# Patient Record
Sex: MALE | Race: WHITE | NOT HISPANIC OR LATINO | Employment: FULL TIME | URBAN - METROPOLITAN AREA
[De-identification: names, ages, dates, MRNs, and addresses within clinical notes are randomized per-mention and may not be internally consistent; named-entity substitution may affect disease eponyms.]

---

## 2023-04-14 ENCOUNTER — TELEPHONE (OUTPATIENT)
Dept: PRIMARY CARE | Facility: CLINIC | Age: 63
End: 2023-04-14
Payer: COMMERCIAL

## 2023-04-14 DIAGNOSIS — E78.00 PURE HYPERCHOLESTEROLEMIA: ICD-10-CM

## 2023-04-14 RX ORDER — ATORVASTATIN CALCIUM 10 MG/1
10 TABLET, FILM COATED ORAL DAILY
Qty: 30 TABLET | Refills: 0 | Status: SHIPPED | OUTPATIENT
Start: 2023-04-14 | End: 2023-05-08 | Stop reason: SDUPTHER

## 2023-04-30 ASSESSMENT — PROMIS GLOBAL HEALTH SCALE
RATE_AVERAGE_FATIGUE: MILD
RATE_QUALITY_OF_LIFE: VERY GOOD
RATE_AVERAGE_PAIN: 0
RATE_GENERAL_HEALTH: VERY GOOD
CARRYOUT_SOCIAL_ACTIVITIES: EXCELLENT
RATE_MENTAL_HEALTH: VERY GOOD
EMOTIONAL_PROBLEMS: RARELY
RATE_PHYSICAL_HEALTH: VERY GOOD
CARRYOUT_PHYSICAL_ACTIVITIES: COMPLETELY
RATE_SOCIAL_SATISFACTION: VERY GOOD

## 2023-05-02 ENCOUNTER — OFFICE VISIT (OUTPATIENT)
Dept: PRIMARY CARE | Facility: CLINIC | Age: 63
End: 2023-05-02
Payer: COMMERCIAL

## 2023-05-02 VITALS — WEIGHT: 191 LBS | SYSTOLIC BLOOD PRESSURE: 142 MMHG | BODY MASS INDEX: 25.9 KG/M2 | DIASTOLIC BLOOD PRESSURE: 86 MMHG

## 2023-05-02 DIAGNOSIS — R79.89 LOW VITAMIN D LEVEL: ICD-10-CM

## 2023-05-02 DIAGNOSIS — E78.00 PURE HYPERCHOLESTEROLEMIA: ICD-10-CM

## 2023-05-02 DIAGNOSIS — Z12.5 SCREENING PSA (PROSTATE SPECIFIC ANTIGEN): ICD-10-CM

## 2023-05-02 DIAGNOSIS — R79.89 LOW VITAMIN B12 LEVEL: ICD-10-CM

## 2023-05-02 DIAGNOSIS — R53.83 FATIGUE, UNSPECIFIED TYPE: ICD-10-CM

## 2023-05-02 DIAGNOSIS — K21.9 GASTROESOPHAGEAL REFLUX DISEASE WITHOUT ESOPHAGITIS: ICD-10-CM

## 2023-05-02 DIAGNOSIS — E78.5 HYPERLIPIDEMIA, UNSPECIFIED HYPERLIPIDEMIA TYPE: ICD-10-CM

## 2023-05-02 DIAGNOSIS — I10 HYPERTENSION, UNSPECIFIED TYPE: Primary | ICD-10-CM

## 2023-05-02 PROBLEM — R60.0 EDEMA OF RIGHT LOWER EXTREMITY: Status: RESOLVED | Noted: 2023-05-02 | Resolved: 2023-05-02

## 2023-05-02 PROBLEM — L81.4: Status: RESOLVED | Noted: 2023-05-02 | Resolved: 2023-05-02

## 2023-05-02 PROBLEM — R07.89 CHEST WALL PAIN: Status: RESOLVED | Noted: 2023-05-02 | Resolved: 2023-05-02

## 2023-05-02 PROBLEM — N28.89 KIDNEY MASS: Status: RESOLVED | Noted: 2023-05-02 | Resolved: 2023-05-02

## 2023-05-02 PROBLEM — S99.912A LEFT ANKLE INJURY, INITIAL ENCOUNTER: Status: RESOLVED | Noted: 2023-05-02 | Resolved: 2023-05-02

## 2023-05-02 PROBLEM — S82.842A CLOSED BIMALLEOLAR FRACTURE OF LEFT ANKLE: Status: RESOLVED | Noted: 2023-05-02 | Resolved: 2023-05-02

## 2023-05-02 PROBLEM — R91.8 LUNG NODULES: Status: RESOLVED | Noted: 2023-05-02 | Resolved: 2023-05-02

## 2023-05-02 PROBLEM — J30.9 ALLERGIC RHINITIS: Status: RESOLVED | Noted: 2023-05-02 | Resolved: 2023-05-02

## 2023-05-02 PROCEDURE — 3077F SYST BP >= 140 MM HG: CPT | Performed by: INTERNAL MEDICINE

## 2023-05-02 PROCEDURE — 3079F DIAST BP 80-89 MM HG: CPT | Performed by: INTERNAL MEDICINE

## 2023-05-02 PROCEDURE — 1159F MED LIST DOCD IN RCRD: CPT | Performed by: INTERNAL MEDICINE

## 2023-05-02 PROCEDURE — 1160F RVW MEDS BY RX/DR IN RCRD: CPT | Performed by: INTERNAL MEDICINE

## 2023-05-02 PROCEDURE — 99397 PER PM REEVAL EST PAT 65+ YR: CPT | Performed by: INTERNAL MEDICINE

## 2023-05-02 PROCEDURE — 85025 COMPLETE CBC W/AUTO DIFF WBC: CPT | Performed by: INTERNAL MEDICINE

## 2023-05-02 PROCEDURE — 80061 LIPID PANEL: CPT | Performed by: INTERNAL MEDICINE

## 2023-05-02 PROCEDURE — 84153 ASSAY OF PSA TOTAL: CPT | Performed by: INTERNAL MEDICINE

## 2023-05-02 PROCEDURE — 80053 COMPREHEN METABOLIC PANEL: CPT | Performed by: INTERNAL MEDICINE

## 2023-05-02 PROCEDURE — 1036F TOBACCO NON-USER: CPT | Performed by: INTERNAL MEDICINE

## 2023-05-02 PROCEDURE — 84443 ASSAY THYROID STIM HORMONE: CPT | Performed by: INTERNAL MEDICINE

## 2023-05-02 RX ORDER — MONTELUKAST SODIUM 10 MG/1
1 TABLET ORAL EVERY EVENING
COMMUNITY
Start: 2017-05-04

## 2023-05-02 RX ORDER — TRIAMTERENE/HYDROCHLOROTHIAZID 37.5-25 MG
TABLET ORAL
COMMUNITY
Start: 2014-09-16 | End: 2023-05-08 | Stop reason: SDUPTHER

## 2023-05-02 RX ORDER — CHOLECALCIFEROL (VITAMIN D3) 25 MCG
TABLET ORAL
COMMUNITY
Start: 2014-11-29 | End: 2023-05-08 | Stop reason: SDUPTHER

## 2023-05-02 RX ORDER — OMEPRAZOLE 20 MG/1
CAPSULE, DELAYED RELEASE ORAL
COMMUNITY
Start: 2014-11-29 | End: 2023-05-08 | Stop reason: SDUPTHER

## 2023-05-02 RX ORDER — VITAMIN B COMPLEX
1 CAPSULE ORAL DAILY
COMMUNITY
End: 2023-05-08 | Stop reason: SDUPTHER

## 2023-05-02 RX ORDER — ATORVASTATIN CALCIUM 10 MG/1
1 TABLET, FILM COATED ORAL DAILY
COMMUNITY
Start: 2014-09-16 | End: 2023-05-08 | Stop reason: SDUPTHER

## 2023-05-02 RX ORDER — GLUCOSAMINE/CHONDRO SU A 500-400 MG
1 TABLET ORAL 4 TIMES DAILY
COMMUNITY

## 2023-05-02 RX ORDER — OLOPATADINE HYDROCHLORIDE 1 MG/ML
SOLUTION/ DROPS OPHTHALMIC 2 TIMES DAILY
COMMUNITY
Start: 2016-03-09

## 2023-05-02 ASSESSMENT — ENCOUNTER SYMPTOMS
DEPRESSION: 0
LOSS OF SENSATION IN FEET: 0
OCCASIONAL FEELINGS OF UNSTEADINESS: 0

## 2023-05-02 NOTE — PROGRESS NOTES
Subjective   Patient ID: Basilio Salcedo is a 63 y.o. male who presents for Annual Exam.    HPI   63 years old male comes in to see me for an age related wellness exam.  We treated him for hypertension, hyperlipidemia, GERD, right renal mass measuring 2.3 cm, monitored by CAT scans and MRIs every 6 months.  Dr. Melendez.  Medication reviewed and reconciled.  He is taking Lipitor 10 mg a day.  Maxide 37.525 mg a day and omeprazole.  He did check on colonoscopy due this year by Dr. Contreras  Review of Systems  12 system reviewed and all negative.  Except for seasonal allergies.  Objective   /86 (BP Location: Left arm, Patient Position: Sitting, BP Cuff Size: Adult)   Wt 86.6 kg (191 lb)   BMI 25.90 kg/m²     Physical Exam  Alert oriented in no distress nonicteric sclera or jaundice pleasant and cooperative.  Face symmetrical cranial nerves intact.  Neck supple no masses no lymph no thyromegaly or jugular venous distention.  Lungs clear no rales no wheezing or crackles.  Heart normal S1 and S2 regular rhythm.  Abdomen benign nontender no masses no organomegaly.  Musculoskeletal is normal and good normal equal range of motion.  Neurologically intact.  Assessment/Plan   Diagnoses and all orders for this visit:  Hypertension, unspecified type  -     Comprehensive Metabolic Panel  -     CBC  Hyperlipidemia, unspecified hyperlipidemia type  -     Lipid Panel  Fatigue, unspecified type  -     Thyroid Stimulating Hormone  Screening PSA (prostate specific antigen)  -     Prostate Specific Antigen, Screen

## 2023-05-08 RX ORDER — ATORVASTATIN CALCIUM 10 MG/1
10 TABLET, FILM COATED ORAL DAILY
Qty: 90 TABLET | Refills: 3 | Status: SHIPPED | OUTPATIENT
Start: 2023-05-08 | End: 2024-02-24 | Stop reason: SDUPTHER

## 2023-05-08 RX ORDER — CHOLECALCIFEROL (VITAMIN D3) 25 MCG
25 TABLET ORAL DAILY
Qty: 90 TABLET | Refills: 3 | Status: SHIPPED | OUTPATIENT
Start: 2023-05-08

## 2023-05-08 RX ORDER — VITAMIN B COMPLEX
1 CAPSULE ORAL DAILY
Qty: 90 CAPSULE | Refills: 3 | Status: SHIPPED | OUTPATIENT
Start: 2023-05-08

## 2023-05-08 RX ORDER — OMEPRAZOLE 20 MG/1
20 CAPSULE, DELAYED RELEASE ORAL
Qty: 90 CAPSULE | Refills: 3 | Status: SHIPPED | OUTPATIENT
Start: 2023-05-08 | End: 2024-03-04 | Stop reason: SDUPTHER

## 2023-05-08 RX ORDER — TRIAMTERENE/HYDROCHLOROTHIAZID 37.5-25 MG
1 TABLET ORAL DAILY
Qty: 90 TABLET | Refills: 3 | Status: SHIPPED | OUTPATIENT
Start: 2023-05-08 | End: 2024-03-11 | Stop reason: SDUPTHER

## 2023-10-30 ENCOUNTER — ANCILLARY PROCEDURE (OUTPATIENT)
Dept: RADIOLOGY | Facility: CLINIC | Age: 63
End: 2023-10-30
Payer: COMMERCIAL

## 2023-10-30 DIAGNOSIS — N28.89 OTHER SPECIFIED DISORDERS OF KIDNEY AND URETER: ICD-10-CM

## 2023-10-30 PROCEDURE — 76770 US EXAM ABDO BACK WALL COMP: CPT | Performed by: RADIOLOGY

## 2023-10-30 PROCEDURE — 76770 US EXAM ABDO BACK WALL COMP: CPT

## 2023-11-02 ENCOUNTER — APPOINTMENT (OUTPATIENT)
Dept: UROLOGY | Facility: HOSPITAL | Age: 63
End: 2023-11-02
Payer: COMMERCIAL

## 2023-11-03 ENCOUNTER — OFFICE VISIT (OUTPATIENT)
Dept: UROLOGY | Facility: HOSPITAL | Age: 63
End: 2023-11-03
Payer: COMMERCIAL

## 2023-11-03 DIAGNOSIS — C64.9 MALIGNANT NEOPLASM OF KIDNEY, UNSPECIFIED LATERALITY (MULTI): ICD-10-CM

## 2023-11-03 PROCEDURE — 99214 OFFICE O/P EST MOD 30 MIN: CPT | Performed by: UROLOGY

## 2023-11-03 PROCEDURE — 1160F RVW MEDS BY RX/DR IN RCRD: CPT | Performed by: UROLOGY

## 2023-11-03 PROCEDURE — 1159F MED LIST DOCD IN RCRD: CPT | Performed by: UROLOGY

## 2023-11-03 PROCEDURE — 1036F TOBACCO NON-USER: CPT | Performed by: UROLOGY

## 2023-11-03 NOTE — PROGRESS NOTES
FUV    Last seen - 5/4/23     HISTORY OF PRESENT ILLNESS:   Basilio Salcedo is a 63 y.o. male who is being seen today for FUV with renal US    Hx:  -hx of 2.3 cm right renal mass found on a CT abdomen with IV contrast on 4/21/22   -Sister has a hx of breast cancer, but he has no personal hx of cancer.   -PSA was 0.65 on 5/2/23 and 0.65 on 4/1/22.       PAST MEDICAL HISTORY:  Past Medical History:   Diagnosis Date    Accelerated hypertension 01/01/1997    Acid reflux 05/02/2023    Allergic rhinitis 05/02/2023    Chest wall pain 05/02/2023    Closed bimalleolar fracture of left ankle 05/02/2023    Edema of right lower extremity 05/02/2023    HTN (hypertension) 05/02/2023    Hypercholesterolemia 01/01/1997    Kidney mass 05/02/2023    Left ankle injury, initial encounter 05/02/2023    Liver spots 05/02/2023    Lung nodules 05/02/2023    Personal history of other diseases of the circulatory system     History of hypertension    Personal history of other endocrine, nutritional and metabolic disease     History of hypercholesterolemia    Personal history of peptic ulcer disease     History of peptic ulcer       PAST SURGICAL HISTORY:  Past Surgical History:   Procedure Laterality Date    COLONOSCOPY  02/05/2014    Complete Colonoscopy        ALLERGIES:   Allergies   Allergen Reactions    Animal Dander Other and Runny nose    Bee Pollen Unknown    Dog Dander Unknown    Seasonale (91) [Levonorgestrel-Ethinyl Estrad] Runny nose        MEDICATIONS:   Current Outpatient Medications   Medication Instructions    atorvastatin (LIPITOR) 10 mg, oral, Daily    b complex vitamins capsule 1 capsule, oral, Daily    cholecalciferol (VITAMIN D-3) 25 mcg, oral, Daily    glucosamine-chondroitin 500-400 mg tablet 1 tablet, oral, 4 times daily    montelukast (Singulair) 10 mg tablet 1 tablet, oral, Every evening    olopatadine (Patanol) 0.1 % ophthalmic solution ophthalmic (eye), 2 times daily    omeprazole (PRILOSEC) 20 mg, oral, Daily  "before breakfast    triamterene-hydrochlorothiazid (Maxzide-25) 37.5-25 mg tablet 1 tablet, oral, Daily        PHYSICAL EXAM:  There were no vitals taken for this visit.  Constitutional: Patient appears well-developed and well-nourished. No distress.    Pulmonary/Chest: Effort normal. No respiratory distress.   Abdominal: Soft, ND NT  : WNL  Musculoskeletal: Normal range of motion.    Neurological: Alert and oriented to person, place, and time.  Psychiatric: Normal mood and affect. Behavior is normal. Thought content normal.      Labs:  No results found for: \"TESTOSTERONE\"  No results found for: \"PSA\"  No components found for: \"CBC\"  Lab Results   Component Value Date    CREATININE 0.86 11/16/2022     No components found for: \"TESTOTMS\"  No results found for: \"TESTF\"    Imaging: Renal US 10/30/23 shows Stable upper pole mass in right kidney.     Discussion: Reviewed his renal US results, pt reassured. No urinary complaints at this time. Denies hematuria, dysuria, nocturia, flank pain, and bothersome frequency or urgency. Will follow up in 6 months with a renal CT prior. All questions and concerns were addressed. Patient verbalizes understanding and has no other questions at this time.          Assessment:    No diagnosis found.    Basilio Salcedo is a 63 y.o. male here for FUV     Plan:   1) Follow up in 6 months with a renal CT prior.   All questions and concerns were addressed. Patient verbalizes understanding and has no other questions at this time.     Scribe Attestation  By signing my name below, IAntoinette Scribe   attest that this documentation has been prepared under the direction and in the presence of Jerardo Melendez MD.      "

## 2024-01-16 DIAGNOSIS — Z12.11 COLON CANCER SCREENING: ICD-10-CM

## 2024-01-16 RX ORDER — SOD SULF/POT CHLORIDE/MAG SULF 1.479 G
TABLET ORAL
Qty: 24 TABLET | Refills: 0 | Status: SHIPPED | OUTPATIENT
Start: 2024-01-16

## 2024-02-12 ENCOUNTER — LAB REQUISITION (OUTPATIENT)
Dept: LAB | Facility: HOSPITAL | Age: 64
End: 2024-02-12
Payer: COMMERCIAL

## 2024-02-12 ENCOUNTER — OFFICE VISIT (OUTPATIENT)
Dept: GASTROENTEROLOGY | Facility: EXTERNAL LOCATION | Age: 64
End: 2024-02-12
Payer: COMMERCIAL

## 2024-02-12 DIAGNOSIS — D12.3 BENIGN NEOPLASM OF TRANSVERSE COLON: ICD-10-CM

## 2024-02-12 DIAGNOSIS — K57.30 DIVERTICULOSIS OF LARGE INTESTINE WITHOUT DIVERTICULITIS: ICD-10-CM

## 2024-02-12 DIAGNOSIS — K64.4 RESIDUAL HEMORRHOIDAL SKIN TAG: ICD-10-CM

## 2024-02-12 DIAGNOSIS — D12.2 BENIGN NEOPLASM OF ASCENDING COLON: ICD-10-CM

## 2024-02-12 DIAGNOSIS — Z12.11 ENCOUNTER FOR SCREENING FOR MALIGNANT NEOPLASM OF COLON: Primary | ICD-10-CM

## 2024-02-12 DIAGNOSIS — Z86.010 PERSONAL HISTORY OF COLONIC POLYPS: ICD-10-CM

## 2024-02-12 PROCEDURE — 1036F TOBACCO NON-USER: CPT | Performed by: INTERNAL MEDICINE

## 2024-02-12 PROCEDURE — 45385 COLONOSCOPY W/LESION REMOVAL: CPT | Performed by: INTERNAL MEDICINE

## 2024-02-12 PROCEDURE — 0753T DGTZ GLS MCRSCP SLD LEVEL IV: CPT

## 2024-02-12 PROCEDURE — 88305 TISSUE EXAM BY PATHOLOGIST: CPT | Performed by: PATHOLOGY

## 2024-02-12 PROCEDURE — 88305 TISSUE EXAM BY PATHOLOGIST: CPT

## 2024-02-21 DIAGNOSIS — E78.00 PURE HYPERCHOLESTEROLEMIA: ICD-10-CM

## 2024-02-21 LAB
LABORATORY COMMENT REPORT: NORMAL
PATH REPORT.FINAL DX SPEC: NORMAL
PATH REPORT.GROSS SPEC: NORMAL
PATH REPORT.RELEVANT HX SPEC: NORMAL
PATH REPORT.TOTAL CANCER: NORMAL

## 2024-02-24 DIAGNOSIS — E78.00 PURE HYPERCHOLESTEROLEMIA: ICD-10-CM

## 2024-02-24 RX ORDER — ATORVASTATIN CALCIUM 10 MG/1
10 TABLET, FILM COATED ORAL DAILY
Qty: 90 TABLET | Refills: 3 | Status: SHIPPED | OUTPATIENT
Start: 2024-02-24

## 2024-02-24 RX ORDER — ATORVASTATIN CALCIUM 10 MG/1
10 TABLET, FILM COATED ORAL DAILY
Qty: 90 TABLET | Refills: 3 | Status: SHIPPED | OUTPATIENT
Start: 2024-02-24 | End: 2024-05-18 | Stop reason: SDUPTHER

## 2024-02-26 ENCOUNTER — TELEPHONE (OUTPATIENT)
Dept: PRIMARY CARE | Facility: CLINIC | Age: 64
End: 2024-02-26
Payer: COMMERCIAL

## 2024-02-26 NOTE — TELEPHONE ENCOUNTER
----- Message from Pedro Pablo Alejandro MD sent at 2/24/2024  1:31 PM EST -----  Regarding: R  POLYPS REMOVED, REPEAT COLONOSCOPY IN 5 YEARS

## 2024-02-29 DIAGNOSIS — K21.9 GASTROESOPHAGEAL REFLUX DISEASE WITHOUT ESOPHAGITIS: ICD-10-CM

## 2024-03-04 DIAGNOSIS — K21.9 GASTROESOPHAGEAL REFLUX DISEASE WITHOUT ESOPHAGITIS: ICD-10-CM

## 2024-03-04 RX ORDER — OMEPRAZOLE 20 MG/1
20 CAPSULE, DELAYED RELEASE ORAL
Qty: 90 CAPSULE | Refills: 3 | Status: SHIPPED | OUTPATIENT
Start: 2024-03-04 | End: 2024-05-18 | Stop reason: SDUPTHER

## 2024-03-04 RX ORDER — OMEPRAZOLE 20 MG/1
20 CAPSULE, DELAYED RELEASE ORAL
Qty: 90 CAPSULE | Refills: 3 | Status: SHIPPED | OUTPATIENT
Start: 2024-03-04

## 2024-03-09 DIAGNOSIS — I10 HYPERTENSION, UNSPECIFIED TYPE: ICD-10-CM

## 2024-03-11 DIAGNOSIS — I10 HYPERTENSION, UNSPECIFIED TYPE: ICD-10-CM

## 2024-03-11 RX ORDER — TRIAMTERENE/HYDROCHLOROTHIAZID 37.5-25 MG
1 TABLET ORAL DAILY
Qty: 90 TABLET | Refills: 3 | Status: SHIPPED | OUTPATIENT
Start: 2024-03-11

## 2024-03-11 RX ORDER — TRIAMTERENE/HYDROCHLOROTHIAZID 37.5-25 MG
1 TABLET ORAL DAILY
Qty: 90 TABLET | Refills: 3 | Status: SHIPPED | OUTPATIENT
Start: 2024-03-11 | End: 2024-05-18 | Stop reason: SDUPTHER

## 2024-04-29 ENCOUNTER — LAB (OUTPATIENT)
Dept: LAB | Facility: LAB | Age: 64
End: 2024-04-29
Payer: COMMERCIAL

## 2024-04-29 DIAGNOSIS — C64.9 MALIGNANT NEOPLASM OF KIDNEY, UNSPECIFIED LATERALITY (MULTI): ICD-10-CM

## 2024-04-29 LAB
CREAT SERPL-MCNC: 0.97 MG/DL (ref 0.5–1.3)
EGFRCR SERPLBLD CKD-EPI 2021: 86 ML/MIN/1.73M*2

## 2024-04-29 PROCEDURE — 36415 COLL VENOUS BLD VENIPUNCTURE: CPT

## 2024-04-29 PROCEDURE — 82565 ASSAY OF CREATININE: CPT

## 2024-05-03 ENCOUNTER — HOSPITAL ENCOUNTER (OUTPATIENT)
Dept: RADIOLOGY | Facility: CLINIC | Age: 64
Discharge: HOME | End: 2024-05-03
Payer: COMMERCIAL

## 2024-05-03 DIAGNOSIS — C64.9 MALIGNANT NEOPLASM OF KIDNEY, UNSPECIFIED LATERALITY (MULTI): ICD-10-CM

## 2024-05-03 PROCEDURE — 74170 CT ABD WO CNTRST FLWD CNTRST: CPT

## 2024-05-03 PROCEDURE — 74170 CT ABD WO CNTRST FLWD CNTRST: CPT | Performed by: RADIOLOGY

## 2024-05-03 PROCEDURE — 2550000001 HC RX 255 CONTRASTS: Performed by: UROLOGY

## 2024-05-03 RX ADMIN — IOHEXOL 75 ML: 350 INJECTION, SOLUTION INTRAVENOUS at 09:50

## 2024-05-09 NOTE — PROGRESS NOTES
FUV    Last seen - 11/3/23     HISTORY OF PRESENT ILLNESS:   Basilio Salcedo is a 64 y.o. male who is being seen today for FUV with renal CT prior    Hx:  -hx of 2.3 cm right renal mass found on a CT abdomen with IV contrast on 4/21/22   -Sister has a hx of breast cancer, but he has no personal hx of cancer.   -PSA was 0.65 on 5/2/23 and 0.65 on 4/1/22.       PAST MEDICAL HISTORY:  Past Medical History:   Diagnosis Date    Accelerated hypertension 01/01/1997    Acid reflux 05/02/2023    Allergic rhinitis 05/02/2023    Chest wall pain 05/02/2023    Closed bimalleolar fracture of left ankle 05/02/2023    Edema of right lower extremity 05/02/2023    HTN (hypertension) 05/02/2023    Hypercholesterolemia 01/01/1997    Kidney mass 05/02/2023    Left ankle injury, initial encounter 05/02/2023    Liver spots 05/02/2023    Lung nodules 05/02/2023    Personal history of other diseases of the circulatory system     History of hypertension    Personal history of other endocrine, nutritional and metabolic disease     History of hypercholesterolemia    Personal history of peptic ulcer disease     History of peptic ulcer       PAST SURGICAL HISTORY:  Past Surgical History:   Procedure Laterality Date    COLONOSCOPY  02/05/2014    Complete Colonoscopy        ALLERGIES:   Allergies   Allergen Reactions    Animal Dander Other and Runny nose    Bee Pollen Unknown    Dog Dander Unknown    Seasonale (91) [Levonorgestrel-Ethinyl Estrad] Runny nose        MEDICATIONS:   Current Outpatient Medications   Medication Instructions    atorvastatin (LIPITOR) 10 mg, oral, Daily    atorvastatin (LIPITOR) 10 mg, oral, Daily    b complex vitamins capsule 1 capsule, oral, Daily    cholecalciferol (VITAMIN D-3) 25 mcg, oral, Daily    glucosamine-chondroitin 500-400 mg tablet 1 tablet, oral, 4 times daily    montelukast (Singulair) 10 mg tablet 1 tablet, oral, Every evening    olopatadine (Patanol) 0.1 % ophthalmic solution ophthalmic (eye), 2 times  "daily    omeprazole (PRILOSEC) 20 mg, oral, Daily before breakfast    omeprazole (PRILOSEC) 20 mg, oral, Daily before breakfast    sod sulf-pot chloride-mag sulf (Sutab) 1.479-0.188- 0.225 gram tablet Starting at 6pm open one bottle of pills and fill glass provided with water and drink according to prep sheet. Start the 2nd bottle with directions on prep sheet 5 hours before procedure time    triamterene-hydrochlorothiazid (Maxzide-25) 37.5-25 mg tablet 1 tablet, oral, Daily    triamterene-hydrochlorothiazid (Maxzide-25) 37.5-25 mg tablet 1 tablet, oral, Daily        PHYSICAL EXAM:  There were no vitals taken for this visit.  Constitutional: Patient appears well-developed and well-nourished. No distress.    Pulmonary/Chest: Effort normal. No respiratory distress.   Abdominal: Soft, ND NT  : WNL  Musculoskeletal: Normal range of motion.    Neurological: Alert and oriented to person, place, and time.  Psychiatric: Normal mood and affect. Behavior is normal. Thought content normal.      Labs:  No results found for: \"TESTOSTERONE\"  No results found for: \"PSA\"  No components found for: \"CBC\"  Lab Results   Component Value Date    CREATININE 0.97 04/29/2024     No components found for: \"TESTOTMS\"  No results found for: \"TESTF\"    Imaging:   Renal CT on 5/3/24 demonstrated No significant change in 2.2 cm enhancing right upper pole renal mass consistent with renal cell carcinoma in comparison with MRI 05/01/2023 and CT 11/21/2022.    Discussion: Reviewed his renal CT results, pt reassured. Stable renal mass since 2022, we again discussed treatment options of cryoablation vs surgery. Patient is comfortable to continue with surveillance. No urinary complaints at this time. Denies hematuria, dysuria, nocturia, flank pain, and bothersome frequency or urgency. Denies any unintentional weight loss. Erections are okay. Will follow up in 6 months with a renal MRI prior. All questions and concerns were addressed. Patient " verbalizes understanding and has no other questions at this time.    Assessment:      1. Malignant neoplasm of kidney, unspecified laterality (Multi)  MR abdomen renal w and wo IV contrast          Basilio Salcedo is a 64 y.o. male here for FUV     Plan:   1) Follow up in 6 months with a renal MRI prior.   All questions and concerns were addressed. Patient verbalizes understanding and has no other questions at this time.     Scribe Attestation  By signing my name below, IAntoinette Scribe   attest that this documentation has been prepared under the direction and in the presence of Jerardo Melendez MD.

## 2024-05-10 ENCOUNTER — OFFICE VISIT (OUTPATIENT)
Dept: UROLOGY | Facility: HOSPITAL | Age: 64
End: 2024-05-10
Payer: COMMERCIAL

## 2024-05-10 DIAGNOSIS — C64.9 MALIGNANT NEOPLASM OF KIDNEY, UNSPECIFIED LATERALITY (MULTI): ICD-10-CM

## 2024-05-10 PROCEDURE — 99214 OFFICE O/P EST MOD 30 MIN: CPT | Performed by: UROLOGY

## 2024-05-10 PROCEDURE — G2211 COMPLEX E/M VISIT ADD ON: HCPCS | Performed by: UROLOGY

## 2024-05-20 ENCOUNTER — OFFICE VISIT (OUTPATIENT)
Dept: PRIMARY CARE | Facility: CLINIC | Age: 64
End: 2024-05-20
Payer: COMMERCIAL

## 2024-05-20 VITALS
HEIGHT: 71 IN | DIASTOLIC BLOOD PRESSURE: 97 MMHG | HEART RATE: 68 BPM | OXYGEN SATURATION: 94 % | WEIGHT: 188.5 LBS | TEMPERATURE: 97.2 F | SYSTOLIC BLOOD PRESSURE: 157 MMHG | BODY MASS INDEX: 26.39 KG/M2

## 2024-05-20 DIAGNOSIS — Z00.00 HEALTH MAINTENANCE EXAMINATION: Primary | ICD-10-CM

## 2024-05-20 DIAGNOSIS — K21.00 GASTROESOPHAGEAL REFLUX DISEASE WITH ESOPHAGITIS WITHOUT HEMORRHAGE: ICD-10-CM

## 2024-05-20 DIAGNOSIS — Z12.11 SCREENING FOR MALIGNANT NEOPLASM OF COLON: ICD-10-CM

## 2024-05-20 LAB
ALBUMIN SERPL BCP-MCNC: 4.4 G/DL (ref 3.4–5)
ALP SERPL-CCNC: 102 U/L (ref 33–136)
ALT SERPL W P-5'-P-CCNC: 42 U/L (ref 10–52)
ANION GAP SERPL CALC-SCNC: 12 MMOL/L (ref 10–20)
APPEARANCE UR: CLEAR
AST SERPL W P-5'-P-CCNC: 26 U/L (ref 9–39)
BILIRUB SERPL-MCNC: 0.7 MG/DL (ref 0–1.2)
BILIRUB UR QL STRIP: NEGATIVE
BUN SERPL-MCNC: 14 MG/DL (ref 6–23)
CALCIUM SERPL-MCNC: 9.2 MG/DL (ref 8.6–10.6)
CHLORIDE SERPL-SCNC: 103 MMOL/L (ref 98–107)
CHOLEST SERPL-MCNC: 138 MG/DL (ref 0–199)
CHOLESTEROL/HDL RATIO: 3.3
CO2 SERPL-SCNC: 28 MMOL/L (ref 21–32)
COLOR UR: YELLOW
CREAT SERPL-MCNC: 0.79 MG/DL (ref 0.5–1.3)
EGFRCR SERPLBLD CKD-EPI 2021: >90 ML/MIN/1.73M*2
ERYTHROCYTE [DISTWIDTH] IN BLOOD BY AUTOMATED COUNT: 13 % (ref 11.5–14.5)
GLUCOSE SERPL-MCNC: 109 MG/DL (ref 74–99)
GLUCOSE UR STRIP-MCNC: NEGATIVE MG/DL
HCT VFR BLD AUTO: 44.5 % (ref 41–52)
HDLC SERPL-MCNC: 41.2 MG/DL
HGB BLD-MCNC: 14.4 G/DL (ref 13.5–17.5)
HGB UR QL STRIP: NEGATIVE
KETONES UR STRIP-MCNC: NEGATIVE MG/DL
LDLC SERPL CALC-MCNC: 77 MG/DL
LEUKOCYTE ESTERASE UR QL STRIP: NEGATIVE
MCH RBC QN AUTO: 30.1 PG (ref 26–34)
MCHC RBC AUTO-ENTMCNC: 32.4 G/DL (ref 32–36)
MCV RBC AUTO: 93 FL (ref 80–100)
NITRITE UR QL STRIP: NEGATIVE
NON HDL CHOLESTEROL: 97 MG/DL (ref 0–149)
NRBC BLD-RTO: 0 /100 WBCS (ref 0–0)
PH UR STRIP: 7.5 [PH]
PLATELET # BLD AUTO: 383 X10*3/UL (ref 150–450)
POTASSIUM SERPL-SCNC: 4.1 MMOL/L (ref 3.5–5.3)
PROT SERPL-MCNC: 7.1 G/DL (ref 6.4–8.2)
PROT UR STRIP-MCNC: NEGATIVE MG/DL
RBC # BLD AUTO: 4.79 X10*6/UL (ref 4.5–5.9)
SODIUM SERPL-SCNC: 139 MMOL/L (ref 136–145)
SP GR UR STRIP.AUTO: 1.01
TRIGL SERPL-MCNC: 100 MG/DL (ref 0–149)
UROBILINOGEN UR STRIP-ACNC: 0.2 E.U./DL
VLDL: 20 MG/DL (ref 0–40)
WBC # BLD AUTO: 7.5 X10*3/UL (ref 4.4–11.3)

## 2024-05-20 PROCEDURE — 85027 COMPLETE CBC AUTOMATED: CPT

## 2024-05-20 PROCEDURE — 1160F RVW MEDS BY RX/DR IN RCRD: CPT | Performed by: INTERNAL MEDICINE

## 2024-05-20 PROCEDURE — 93010 ELECTROCARDIOGRAM REPORT: CPT | Performed by: INTERNAL MEDICINE

## 2024-05-20 PROCEDURE — 36415 COLL VENOUS BLD VENIPUNCTURE: CPT

## 2024-05-20 PROCEDURE — 84153 ASSAY OF PSA TOTAL: CPT

## 2024-05-20 PROCEDURE — 81003 URINALYSIS AUTO W/O SCOPE: CPT | Performed by: INTERNAL MEDICINE

## 2024-05-20 PROCEDURE — 1036F TOBACCO NON-USER: CPT | Performed by: INTERNAL MEDICINE

## 2024-05-20 PROCEDURE — 80053 COMPREHEN METABOLIC PANEL: CPT

## 2024-05-20 PROCEDURE — 99397 PER PM REEVAL EST PAT 65+ YR: CPT | Performed by: INTERNAL MEDICINE

## 2024-05-20 PROCEDURE — 80061 LIPID PANEL: CPT

## 2024-05-20 PROCEDURE — 84443 ASSAY THYROID STIM HORMONE: CPT

## 2024-05-20 PROCEDURE — 1159F MED LIST DOCD IN RCRD: CPT | Performed by: INTERNAL MEDICINE

## 2024-05-20 ASSESSMENT — PROMIS GLOBAL HEALTH SCALE
RATE_AVERAGE_PAIN: 2
CARRYOUT_PHYSICAL_ACTIVITIES: COMPLETELY
RATE_GENERAL_HEALTH: GOOD
EMOTIONAL_PROBLEMS: SOMETIMES
RATE_PHYSICAL_HEALTH: VERY GOOD
RATE_AVERAGE_FATIGUE: MILD
RATE_QUALITY_OF_LIFE: GOOD
CARRYOUT_SOCIAL_ACTIVITIES: VERY GOOD
RATE_MENTAL_HEALTH: GOOD
RATE_SOCIAL_SATISFACTION: VERY GOOD

## 2024-05-20 ASSESSMENT — PATIENT HEALTH QUESTIONNAIRE - PHQ9
SUM OF ALL RESPONSES TO PHQ9 QUESTIONS 1 AND 2: 0
2. FEELING DOWN, DEPRESSED OR HOPELESS: NOT AT ALL
1. LITTLE INTEREST OR PLEASURE IN DOING THINGS: NOT AT ALL

## 2024-05-20 ASSESSMENT — ENCOUNTER SYMPTOMS
OCCASIONAL FEELINGS OF UNSTEADINESS: 0
DEPRESSION: 0
LOSS OF SENSATION IN FEET: 0

## 2024-05-20 NOTE — PROGRESS NOTES
"Subjective   Patient ID: Basilio Salcedo is a 64 y.o. male who presents for Annual Exam (Pt would like a referral for upper GI scan) and Joint Pain.    HPI   64 years old male comes in today for an age-related wellness exam follow-up with all health maintenance.  He is feeling well has no specific complaint or symptoms.  Admits to severe acid reflux condition.  Sees Dr. Ben MCCONNELL and had colonoscopy 6 months ago.  Now he is wanting to have an EGD.  Medications reviewed and reconciled takes Lipitor 10.  Vitamin D.  Glucosamine/chondroitin.  Singulair.  Patanol.  Prilosec.  Maxide and vitamin B complex.  Lives in [place] with his wife Phyllis no children.  No known allergies.  Non-smoker beers twice a week cocktail once twice a month  Works in the [employer]   No surgical procedure done.  Mother  age 96 age.  Father  from heart disease 67.  2 sisters depression and CAD 1 brother in good health.    Review of Systems  12 system review 12 system are negative.  Referral for EGD done.  Avoid too much salt in your diet done repeated blood pressure today 150/84.  Keep exercising.  Stay active.  Objective   BP (!) 157/97 (BP Location: Left arm, Patient Position: Sitting, BP Cuff Size: Adult)   Pulse 68   Temp 36.2 °C (97.2 °F) (Temporal)   Ht 1.795 m (5' 10.67\")   Wt 85.5 kg (188 lb 8 oz)   SpO2 94%   BMI 26.54 kg/m²   EKG was done and is normal  Physical Exam  Alert oriented in no acute distress pleasant cooperative.  Nonicteric sclera no jaundice.  A dark jimenez on the forehead covered by his hair on the right side.  Face symmetrical cranial nerves intact.  Neck supple no masses no lymph node no thyromegaly no jugular venous distention.  Lungs clear no rales wheezing or crackles.  Heart normal S1 and S2 regular rhythm.  Abdomen benign nontender no masses no organomegaly or pain on palpation.  Neurological exam intact.  Assessment/Plan   Diagnoses and all orders for this visit:  Health maintenance examination  -   "   ECG 12 lead (Clinic Performed)  -     POCT UA (Automated) docked device  -     CBC  -     Comprehensive Metabolic Panel  -     Lipid Panel  -     Prostate Specific Antigen, Screen  -     Thyroid Stimulating Hormone  Screening for malignant neoplasm of colon  Gastroesophageal reflux disease with esophagitis without hemorrhage  -     Referral to Gastroenterology; Future  Other orders  -     POCT URINALYSIS AUTOMATED

## 2024-05-23 LAB
PSA SERPL-MCNC: 0.35 NG/ML
TSH SERPL-ACNC: 1.14 MIU/L (ref 0.44–3.98)

## 2024-08-15 ENCOUNTER — TELEPHONE (OUTPATIENT)
Dept: PRIMARY CARE | Facility: CLINIC | Age: 64
End: 2024-08-15
Payer: COMMERCIAL

## 2024-08-15 DIAGNOSIS — I10 HYPERTENSION, UNSPECIFIED TYPE: ICD-10-CM

## 2024-08-15 RX ORDER — TRIAMTERENE/HYDROCHLOROTHIAZID 37.5-25 MG
1 TABLET ORAL DAILY
Qty: 90 TABLET | Refills: 3 | Status: SHIPPED | OUTPATIENT
Start: 2024-08-15 | End: 2025-08-15

## 2024-08-27 ENCOUNTER — TELEPHONE (OUTPATIENT)
Dept: GASTROENTEROLOGY | Facility: CLINIC | Age: 64
End: 2024-08-27
Payer: COMMERCIAL

## 2024-08-27 DIAGNOSIS — K21.9 GASTROESOPHAGEAL REFLUX DISEASE, UNSPECIFIED WHETHER ESOPHAGITIS PRESENT: Primary | ICD-10-CM

## 2024-08-27 NOTE — TELEPHONE ENCOUNTER
Patient would like to schedule EGD, stated you and he discussed that at the time of his CS in February, 2024.   Please let me know if you want an OV first.    Thank You

## 2024-09-06 ENCOUNTER — APPOINTMENT (OUTPATIENT)
Dept: GASTROENTEROLOGY | Facility: CLINIC | Age: 64
End: 2024-09-06
Payer: COMMERCIAL

## 2024-10-22 ENCOUNTER — APPOINTMENT (OUTPATIENT)
Dept: GASTROENTEROLOGY | Facility: CLINIC | Age: 64
End: 2024-10-22
Payer: COMMERCIAL

## 2024-10-23 ENCOUNTER — LAB REQUISITION (OUTPATIENT)
Dept: LAB | Facility: HOSPITAL | Age: 64
End: 2024-10-23
Payer: COMMERCIAL

## 2024-10-23 ENCOUNTER — APPOINTMENT (OUTPATIENT)
Dept: GASTROENTEROLOGY | Facility: EXTERNAL LOCATION | Age: 64
End: 2024-10-23
Payer: COMMERCIAL

## 2024-10-23 DIAGNOSIS — Q40.2 OTHER SPECIFIED CONGENITAL MALFORMATIONS OF STOMACH: ICD-10-CM

## 2024-10-23 DIAGNOSIS — K21.9 GASTRO-ESOPHAGEAL REFLUX DISEASE WITHOUT ESOPHAGITIS: ICD-10-CM

## 2024-10-23 DIAGNOSIS — R12 HEARTBURN: ICD-10-CM

## 2024-10-23 DIAGNOSIS — K44.9 DIAPHRAGMATIC HERNIA WITHOUT OBSTRUCTION OR GANGRENE: Primary | ICD-10-CM

## 2024-10-23 DIAGNOSIS — K31.89 OTHER DISEASES OF STOMACH AND DUODENUM: ICD-10-CM

## 2024-10-23 DIAGNOSIS — K21.9 GASTROESOPHAGEAL REFLUX DISEASE, UNSPECIFIED WHETHER ESOPHAGITIS PRESENT: ICD-10-CM

## 2024-10-23 DIAGNOSIS — K31.7 POLYP OF STOMACH AND DUODENUM: ICD-10-CM

## 2024-10-23 PROCEDURE — 43239 EGD BIOPSY SINGLE/MULTIPLE: CPT | Performed by: INTERNAL MEDICINE

## 2024-10-23 PROCEDURE — 88305 TISSUE EXAM BY PATHOLOGIST: CPT | Performed by: PATHOLOGY

## 2024-10-23 PROCEDURE — 88305 TISSUE EXAM BY PATHOLOGIST: CPT

## 2024-10-23 PROCEDURE — 0753T DGTZ GLS MCRSCP SLD LEVEL IV: CPT

## 2024-10-25 ENCOUNTER — HOSPITAL ENCOUNTER (OUTPATIENT)
Dept: RADIOLOGY | Facility: CLINIC | Age: 64
Discharge: HOME | End: 2024-10-25
Payer: COMMERCIAL

## 2024-10-25 DIAGNOSIS — C64.9 MALIGNANT NEOPLASM OF KIDNEY, UNSPECIFIED LATERALITY (MULTI): ICD-10-CM

## 2024-10-25 PROCEDURE — 74183 MRI ABD W/O CNTR FLWD CNTR: CPT

## 2024-10-25 PROCEDURE — 2550000001 HC RX 255 CONTRASTS: Performed by: UROLOGY

## 2024-10-25 PROCEDURE — A9575 INJ GADOTERATE MEGLUMI 0.1ML: HCPCS | Performed by: UROLOGY

## 2024-10-25 RX ORDER — GADOTERATE MEGLUMINE 376.9 MG/ML
0.2 INJECTION INTRAVENOUS
Status: COMPLETED | OUTPATIENT
Start: 2024-10-25 | End: 2024-10-25

## 2024-11-01 ENCOUNTER — OFFICE VISIT (OUTPATIENT)
Dept: UROLOGY | Facility: HOSPITAL | Age: 64
End: 2024-11-01
Payer: COMMERCIAL

## 2024-11-01 DIAGNOSIS — N28.89 RIGHT RENAL MASS: ICD-10-CM

## 2024-11-01 PROCEDURE — 99214 OFFICE O/P EST MOD 30 MIN: CPT | Performed by: UROLOGY

## 2024-11-01 PROCEDURE — G2211 COMPLEX E/M VISIT ADD ON: HCPCS | Performed by: UROLOGY

## 2025-04-04 LAB
ANION GAP SERPL CALCULATED.4IONS-SCNC: 11 MMOL/L (CALC) (ref 7–17)
BUN SERPL-MCNC: 13 MG/DL (ref 7–25)
BUN/CREAT SERPL: NORMAL (CALC) (ref 6–22)
CALCIUM SERPL-MCNC: 9.5 MG/DL (ref 8.6–10.3)
CHLORIDE SERPL-SCNC: 102 MMOL/L (ref 98–110)
CO2 SERPL-SCNC: 28 MMOL/L (ref 20–32)
CREAT SERPL-MCNC: 0.86 MG/DL (ref 0.7–1.35)
EGFRCR SERPLBLD CKD-EPI 2021: 95 ML/MIN/1.73M2
GLUCOSE SERPL-MCNC: 97 MG/DL (ref 65–99)
POTASSIUM SERPL-SCNC: 4.4 MMOL/L (ref 3.5–5.3)
SODIUM SERPL-SCNC: 141 MMOL/L (ref 135–146)

## 2025-04-11 ENCOUNTER — HOSPITAL ENCOUNTER (OUTPATIENT)
Dept: RADIOLOGY | Facility: CLINIC | Age: 65
Discharge: HOME | End: 2025-04-11
Payer: COMMERCIAL

## 2025-04-11 DIAGNOSIS — N28.89 RIGHT RENAL MASS: ICD-10-CM

## 2025-04-11 PROCEDURE — 74170 CT ABD WO CNTRST FLWD CNTRST: CPT

## 2025-04-11 PROCEDURE — 2550000001 HC RX 255 CONTRASTS: Performed by: UROLOGY

## 2025-04-11 RX ADMIN — IOHEXOL 75 ML: 350 INJECTION, SOLUTION INTRAVENOUS at 09:14

## 2025-05-01 NOTE — PROGRESS NOTES
FUV    Last seen - 11/1/24     HISTORY OF PRESENT ILLNESS:   Basilio Salcedo is a 65 y.o. male who is being seen today for FUV with renal CT prior    Hx:  -hx of 2.3 cm right renal mass found on a CT abdomen with IV contrast on 4/21/22   -Sister has a hx of breast cancer, but he has no personal hx of cancer.   -PSA was 0.65 on 5/2/23 and 0.65 on 4/1/22.       PAST MEDICAL HISTORY:  Past Medical History:   Diagnosis Date    Accelerated hypertension 01/01/1997    Acid reflux 05/02/2023    Allergic rhinitis 05/02/2023    Chest wall pain 05/02/2023    Closed bimalleolar fracture of left ankle 05/02/2023    Edema of right lower extremity 05/02/2023    HTN (hypertension) 05/02/2023    Hypercholesterolemia 01/01/1997    Kidney mass 05/02/2023    Left ankle injury, initial encounter 05/02/2023    Liver spots 05/02/2023    Lung nodules 05/02/2023    Personal history of other diseases of the circulatory system     History of hypertension    Personal history of other endocrine, nutritional and metabolic disease     History of hypercholesterolemia    Personal history of peptic ulcer disease     History of peptic ulcer       PAST SURGICAL HISTORY:  Past Surgical History:   Procedure Laterality Date    COLONOSCOPY  02/05/2014    Complete Colonoscopy        ALLERGIES:   Allergies   Allergen Reactions    Animal Dander Other and Runny nose    Bee Pollen Unknown    Dog Dander Unknown    Seasonale (91) [Levonorgestrel-Ethinyl Estrad] Runny nose        MEDICATIONS:   Current Outpatient Medications   Medication Instructions    atorvastatin (LIPITOR) 10 mg, oral, Daily    b complex vitamins capsule 1 capsule, oral, Daily    cholecalciferol (VITAMIN D-3) 25 mcg, oral, Daily    glucosamine-chondroitin 500-400 mg tablet 1 tablet, oral, 4 times daily    montelukast (Singulair) 10 mg tablet 1 tablet, oral, Every evening    multivitamin with minerals iron-free (Centrum Silver) 1 tablet, oral, Daily    olopatadine (Patanol) 0.1 % ophthalmic  "solution ophthalmic (eye), 2 times daily    omeprazole (PRILOSEC) 20 mg, oral, Daily before breakfast    sod sulf-pot chloride-mag sulf (Sutab) 1.479-0.188- 0.225 gram tablet Starting at 6pm open one bottle of pills and fill glass provided with water and drink according to prep sheet. Start the 2nd bottle with directions on prep sheet 5 hours before procedure time    triamterene-hydrochlorothiazid (Maxzide-25) 37.5-25 mg tablet 1 tablet, oral, Daily        PHYSICAL EXAM:  There were no vitals taken for this visit.  Constitutional: Patient appears well-developed and well-nourished. No distress.    Pulmonary/Chest: Effort normal. No respiratory distress.   Abdominal: Soft, ND NT  : WNL  Musculoskeletal: Normal range of motion.    Neurological: Alert and oriented to person, place, and time.  Psychiatric: Normal mood and affect. Behavior is normal. Thought content normal.      Labs:  No results found for: \"TESTOSTERONE\"  Lab Results   Component Value Date    PSA 0.35 05/20/2024     No components found for: \"CBC\"  Lab Results   Component Value Date    CREATININE 0.86 04/04/2025     No components found for: \"TESTOTMS\"  No results found for: \"TESTF\"    Imaging:   Renal MRI 10/25/24: IMPRESSION:  1.  Increased size of a solid arterial enhancing right upper pole renal mass when compared to prior MRI on 05/01/2023. Findings are concerning for renal neoplasm such as renal cell carcinoma. The renal veins remain patent.    Renal CT on 5/3/24 demonstrated No significant change in 2.2 cm enhancing right upper pole renal mass consistent with renal cell carcinoma in comparison with MRI 05/01/2023 and CT 11/21/2022.    Renal CT, 4/11/25:  Approximately 3.0 x 2.5 x 2.8 cm enhancing mass involving superior pole cortex of the right kidney suggestive of malignancy such as renal cell carcinoma.     2. Large hiatal hernia containing part of stomach. No bowel obstruction or free air.  3. Mild colonic diverticulosis but no CT evidence for " acute diverticulitis. Please note that the bowel loops are not entirely  included in the field of view. 4. Small bowel ileus. No evidence for obstruction or free air.  5. Numerous subcentimeter low-attenuation lesions throughout the liver suggestive of cysts or hamartomas but too small to fully characterize.    Discussion: Reviewed his renal CT results, pt reassured. Renal mass very slightly increased in size. We again discussed treatment options of cryoablation vs partial nephrectomy. Risks of surgery like bleeding, infection, etc discussed with patient. Also discussed radiosurgery but discussed he is likely not an ideal candidate for this. We discussed the risk, benefit, step-by-step procedure, adverse events, side effects, outcomes, of each treatment. Patient interested in cryoablation. Will refer him to Dr Verduzco in IR for further management.     Assessment:      1. Right renal mass  Consult to Interventional Radiology            Basilio Salcedo is a 65 y.o. male here for FUV     Plan:   1) Refer to Wood Verduzco in IR for possible cryoablation of renal mass  All questions and concerns were addressed. Patient verbalizes understanding and has no other questions at this time.     Scribe Attestation  By signing my name below, I, Antoinette Vidales , Eliel   attest that this documentation has been prepared under the direction and in the presence of Jerardo Melendez MD.

## 2025-05-02 ENCOUNTER — OFFICE VISIT (OUTPATIENT)
Dept: UROLOGY | Facility: HOSPITAL | Age: 65
End: 2025-05-02
Payer: COMMERCIAL

## 2025-05-02 DIAGNOSIS — N28.89 RIGHT RENAL MASS: ICD-10-CM

## 2025-05-02 PROCEDURE — 99214 OFFICE O/P EST MOD 30 MIN: CPT | Performed by: UROLOGY

## 2025-05-02 PROCEDURE — 1159F MED LIST DOCD IN RCRD: CPT | Performed by: UROLOGY

## 2025-05-02 PROCEDURE — G2211 COMPLEX E/M VISIT ADD ON: HCPCS | Performed by: UROLOGY

## 2025-05-17 DIAGNOSIS — K21.9 GASTROESOPHAGEAL REFLUX DISEASE WITHOUT ESOPHAGITIS: ICD-10-CM

## 2025-05-17 DIAGNOSIS — E78.00 PURE HYPERCHOLESTEROLEMIA: ICD-10-CM

## 2025-05-21 RX ORDER — OMEPRAZOLE 20 MG/1
20 CAPSULE, DELAYED RELEASE ORAL
Qty: 90 CAPSULE | Refills: 3 | Status: SHIPPED | OUTPATIENT
Start: 2025-05-21

## 2025-05-21 RX ORDER — ATORVASTATIN CALCIUM 10 MG/1
10 TABLET, FILM COATED ORAL DAILY
Qty: 90 TABLET | Refills: 3 | Status: SHIPPED | OUTPATIENT
Start: 2025-05-21

## 2025-05-22 ENCOUNTER — APPOINTMENT (OUTPATIENT)
Dept: PRIMARY CARE | Facility: CLINIC | Age: 65
End: 2025-05-22
Payer: COMMERCIAL

## 2025-06-05 ENCOUNTER — APPOINTMENT (OUTPATIENT)
Dept: RADIOLOGY | Facility: HOSPITAL | Age: 65
End: 2025-06-05
Payer: COMMERCIAL

## 2025-06-11 ENCOUNTER — HOSPITAL ENCOUNTER (OUTPATIENT)
Dept: RADIOLOGY | Facility: HOSPITAL | Age: 65
Discharge: HOME | End: 2025-06-11
Payer: COMMERCIAL

## 2025-06-11 VITALS
OXYGEN SATURATION: 97 % | WEIGHT: 186 LBS | DIASTOLIC BLOOD PRESSURE: 91 MMHG | BODY MASS INDEX: 25.19 KG/M2 | HEART RATE: 79 BPM | HEIGHT: 72 IN | SYSTOLIC BLOOD PRESSURE: 172 MMHG

## 2025-06-11 DIAGNOSIS — N28.89 RIGHT RENAL MASS: ICD-10-CM

## 2025-06-11 PROCEDURE — 99204 OFFICE O/P NEW MOD 45 MIN: CPT | Performed by: STUDENT IN AN ORGANIZED HEALTH CARE EDUCATION/TRAINING PROGRAM

## 2025-06-16 ENCOUNTER — PREP FOR PROCEDURE (OUTPATIENT)
Dept: RADIOLOGY | Facility: HOSPITAL | Age: 65
End: 2025-06-16
Payer: COMMERCIAL

## 2025-06-16 DIAGNOSIS — N28.89 RIGHT RENAL MASS: Primary | ICD-10-CM

## 2025-06-16 DIAGNOSIS — C64.9 MALIGNANT NEOPLASM OF KIDNEY, UNSPECIFIED LATERALITY (MULTI): ICD-10-CM

## 2025-07-14 DIAGNOSIS — N28.89 RIGHT RENAL MASS: ICD-10-CM

## 2025-07-14 NOTE — CONSULTS
"[facility]  INTERVENTIONAL RADIOLOGY OUTPATIENT CONSULTATION    PCP: Pedro Pablo Alejandro MD    Referring provider: Jerardo Melendez MD    SUBJECTIVE    History Of Present Illness  Basilio Salcedo \"Get\" is a 65 y.o. male with a past medical history of right kidney mass referred for cryoablation evaluation. Right 2.3 cm renal mass found on CT 4/21/22. Mass has been followed with serial imaging and on most recent CT now measures 3.0 x 2.5 x 2.8 cm with enhancement suggestive of malignancy. Patient otherwise is asymptomatic.      Review of Systems: negative except as per HPI     Past Medical History:  Medical History[1]    Home Medications  Prescriptions Prior to Admission[2]    Surgical History:  Surgical History[3]    Allergies:  Allergies[4]    Social History:    Social History     Socioeconomic History    Marital status:      Spouse name: Not on file    Number of children: Not on file    Years of education: Not on file    Highest education level: Not on file   Occupational History    Not on file   Tobacco Use    Smoking status: Never    Smokeless tobacco: Never   Substance and Sexual Activity    Alcohol use: Yes     Alcohol/week: 7.0 standard drinks of alcohol     Types: 6 Cans of beer, 1 Shots of liquor per week    Drug use: Never    Sexual activity: Not on file   Other Topics Concern    Not on file   Social History Narrative    Not on file     Social Drivers of Health     Financial Resource Strain: Not on file   Food Insecurity: Not on file   Transportation Needs: Not on file   Physical Activity: Not on file   Stress: Not on file   Social Connections: Not on file   Intimate Partner Violence: Not on file   Housing Stability: Not on file       Family History:  Family History[5]    OBJECTIVE    Vitals:    Vitals:    06/11/25 1300   BP: (!) 172/91   Pulse: 79   SpO2: 97%   Weight: 84.4 kg (186 lb)   Height: 1.829 m (6')     Failed to redirect to the Timeline version of the [employer] SmartLink.    Physical " "Exam  GENERAL: awake, no acute distress  HEENT: AT/NC  NECK: supple  CV: RRR  PULM: non-labored breathing  ABDOMEN: soft, ND  NEURO: AAOx3                                                                           Medications     Current Outpatient Medications   Medication Instructions    atorvastatin (LIPITOR) 10 mg, oral, Daily    b complex vitamins capsule 1 capsule, oral, Daily    cholecalciferol (VITAMIN D-3) 25 mcg, oral, Daily    glucosamine-chondroitin 500-400 mg tablet 1 tablet, oral, 4 times daily    montelukast (Singulair) 10 mg tablet 1 tablet, oral, Every evening    multivitamin with minerals iron-free (Centrum Silver) 1 tablet, oral, Daily    olopatadine (Patanol) 0.1 % ophthalmic solution ophthalmic (eye), 2 times daily    omeprazole (PRILOSEC) 20 mg, oral, Daily before breakfast    sod sulf-pot chloride-mag sulf (Sutab) 1.479-0.188- 0.225 gram tablet Starting at 6pm open one bottle of pills and fill glass provided with water and drink according to prep sheet. Start the 2nd bottle with directions on prep sheet 5 hours before procedure time    triamterene-hydrochlorothiazid (Maxzide-25) 37.5-25 mg tablet 1 tablet, oral, Daily                                                                              Labs     Lab Results   Component Value Date    WBC 7.5 05/20/2024    HGB 14.4 05/20/2024    HCT 44.5 05/20/2024    MCV 93 05/20/2024     05/20/2024       Lab Results   Component Value Date     04/04/2025    K 4.4 04/04/2025     04/04/2025    CO2 28 04/04/2025    BUN 13 04/04/2025    CREATININE 0.86 04/04/2025     Lab Results   Component Value Date    CALCIUM 9.5 04/04/2025    ALBUMIN 4.4 05/20/2024        Lab Results   Component Value Date    ALT 42 05/20/2024    AST 26 05/20/2024    ALKPHOS 102 05/20/2024     Lab Results   Component Value Date    BILITOT 0.7 05/20/2024     No results found for: \"PROTIME\", \"APTT\", \"INR\", \"ALBUMINELP\"                                                        " "                   Imaging           CT 4/11/25    IMPRESSION:  1. Approximately 3.0 x 2.5 x 2.8 cm enhancing mass involving superior  pole cortex of the right kidney suggestive of malignancy such as  renal cell carcinoma. Surgical/urology consult recommended.      2. Large hiatal hernia containing part of stomach. No bowel  obstruction or free air.      3. Mild colonic diverticulosis but no CT evidence for acute  diverticulitis. Please note that the bowel loops are not entirely  included in the field of view.      4. Small bowel ileus. No evidence for obstruction or free air.      5. Numerous subcentimeter low-attenuation lesions throughout the  liver suggestive of cysts or hamartomas but too small to fully  characterize.      ASSESSMENT / PLAN                  ASSESSMENT/PLAN:  Basilio Salcedo \"Get\" is a 65 y.o. male with enlarging right kidney mass referred for cryoablation evaluation. On most recent CT now measures 3.0 x 2.5 x 2.8 cm with enhancement suggestive of malignancy in the right superior pole.     Schedule cryoablation with anesthesia    Pre procedure labs, to be done prior to procedure (CBC; CMP; PT/INR)  One month clinical follow up to be coordinated   Do not eat or drink 8 hours prior procedure     I held an extensive discussion of the risks, benefits, and alternatives related to the procedure. After the procedure, for the first week the patient will experience transient symptoms such as fatigue, possible hematuria. The associated risks with these sites were explained including bleeding, infection, and injury to nearby structures.     A thorough review of the patients history, images, labs, logistics surrounding the procedure, risks/benefits, education on the procedure, and preparation for the procedure was discussed. The patient was allotted ample time to address questions and concerns. Patient verbalized understanding and wishes to proceed with the procedure.    Samuel Chiu MD  Department " of Radiology  Interventional Radiology    I personally spent 30 minutes on this consult with over 50% of time spent discussing management and care of specified diagnosis with patient and/or coordinating care for this patient.       Vascular and Interventional Radiology  [facility]  [phone] Nursing  [phone] Scheduling        [1]   Past Medical History:  Diagnosis Date    Accelerated hypertension 01/01/1997    Acid reflux 05/02/2023    Allergic rhinitis 05/02/2023    Chest wall pain 05/02/2023    Closed bimalleolar fracture of left ankle 05/02/2023    Edema of right lower extremity 05/02/2023    HTN (hypertension) 05/02/2023    Hypercholesterolemia 01/01/1997    Kidney mass 05/02/2023    Left ankle injury, initial encounter 05/02/2023    Liver spots 05/02/2023    Lung nodules 05/02/2023    Personal history of other diseases of the circulatory system     History of hypertension    Personal history of other endocrine, nutritional and metabolic disease     History of hypercholesterolemia    Personal history of peptic ulcer disease     History of peptic ulcer   [2] (Not in a hospital admission)  [3]   Past Surgical History:  Procedure Laterality Date    COLONOSCOPY  02/05/2014    Complete Colonoscopy   [4]   Allergies  Allergen Reactions    Animal Dander Other and Runny nose    Bee Pollen Unknown    Dog Dander Unknown    Seasonale (91) [Levonorgestrel-Ethinyl Estrad] Runny nose   [5]   Family History  Problem Relation Name Age of Onset    Other (Cardiac disorder) Father

## 2025-07-17 ENCOUNTER — LAB (OUTPATIENT)
Dept: LAB | Facility: HOSPITAL | Age: 65
End: 2025-07-17
Payer: COMMERCIAL

## 2025-07-17 DIAGNOSIS — N28.89 OTHER SPECIFIED DISORDERS OF KIDNEY AND URETER: Primary | ICD-10-CM

## 2025-07-17 PROCEDURE — 85027 COMPLETE CBC AUTOMATED: CPT

## 2025-07-17 PROCEDURE — 36415 COLL VENOUS BLD VENIPUNCTURE: CPT

## 2025-07-17 PROCEDURE — 80053 COMPREHEN METABOLIC PANEL: CPT

## 2025-07-17 PROCEDURE — 85610 PROTHROMBIN TIME: CPT

## 2025-07-18 LAB
ALBUMIN SERPL BCP-MCNC: 4.4 G/DL (ref 3.4–5)
ALP SERPL-CCNC: 124 U/L (ref 33–136)
ALT SERPL W P-5'-P-CCNC: 34 U/L (ref 10–52)
ANION GAP SERPL CALC-SCNC: 12 MMOL/L (ref 10–20)
AST SERPL W P-5'-P-CCNC: 25 U/L (ref 9–39)
BILIRUB SERPL-MCNC: 0.4 MG/DL (ref 0–1.2)
BUN SERPL-MCNC: 14 MG/DL (ref 6–23)
CALCIUM SERPL-MCNC: 9.4 MG/DL (ref 8.6–10.6)
CHLORIDE SERPL-SCNC: 100 MMOL/L (ref 98–107)
CO2 SERPL-SCNC: 30 MMOL/L (ref 21–32)
CREAT SERPL-MCNC: 0.98 MG/DL (ref 0.5–1.3)
EGFRCR SERPLBLD CKD-EPI 2021: 85 ML/MIN/1.73M*2
ERYTHROCYTE [DISTWIDTH] IN BLOOD BY AUTOMATED COUNT: 13 % (ref 11.5–14.5)
GLUCOSE SERPL-MCNC: 98 MG/DL (ref 74–99)
HCT VFR BLD AUTO: 42.2 % (ref 41–52)
HGB BLD-MCNC: 13.2 G/DL (ref 13.5–17.5)
INR PPP: 0.9 (ref 0.9–1.1)
MCH RBC QN AUTO: 29.3 PG (ref 26–34)
MCHC RBC AUTO-ENTMCNC: 31.3 G/DL (ref 32–36)
MCV RBC AUTO: 94 FL (ref 80–100)
NRBC BLD-RTO: 0 /100 WBCS (ref 0–0)
PLATELET # BLD AUTO: 390 X10*3/UL (ref 150–450)
POTASSIUM SERPL-SCNC: 3.8 MMOL/L (ref 3.5–5.3)
PROT SERPL-MCNC: 7.2 G/DL (ref 6.4–8.2)
PROTHROMBIN TIME: 9.9 SECONDS (ref 9.8–12.4)
RBC # BLD AUTO: 4.51 X10*6/UL (ref 4.5–5.9)
SODIUM SERPL-SCNC: 138 MMOL/L (ref 136–145)
WBC # BLD AUTO: 9.5 X10*3/UL (ref 4.4–11.3)

## 2025-07-21 ENCOUNTER — ANESTHESIA EVENT (OUTPATIENT)
Dept: RADIOLOGY | Facility: HOSPITAL | Age: 65
End: 2025-07-21
Payer: COMMERCIAL

## 2025-07-21 NOTE — ANESTHESIA PREPROCEDURE EVALUATION
"Patient: Basilio Salcedo \"Get\"    Procedure Information       Date/Time: 07/22/25 0830    Scheduled providers: Keily Bcuk MD; BRIAN Hinds    Procedure: CT GUIDED CRYOABLATION RENAL RIGHT    Location: [facility]                                                         Pre-Anesthesia Evaluation                                         Basilio Salcedo \"Get\" is a 65 y.o. male who presents for the above mentioned procedure due to Right renal mass [N28.89]    Medical History[1]  Surgical History[2]  Social History[3]  RX Allergies[4]  Current Medications[5]  Prior to Admission medications   Medication Sig Start Date End Date Taking? Authorizing Provider   atorvastatin (Lipitor) 10 mg tablet TAKE 1 TABLET BY MOUTH EVERY DAY 5/21/25   Pedro Pablo Alejandro MD   b complex vitamins capsule Take 1 capsule by mouth once daily. 5/8/23   Pedro Pablo Alejandro MD   cholecalciferol (Vitamin D-3) 25 MCG (1000 UT) tablet Take 1 tablet (25 mcg) by mouth once daily. 5/8/23   Pedro Pablo Alejandro MD   glucosamine-chondroitin 500-400 mg tablet Take 1 tablet by mouth 4 times a day.    Historical Provider, MD   montelukast (Singulair) 10 mg tablet Take 1 tablet (10 mg) by mouth once daily in the evening. 5/4/17   Historical Provider, MD   multivitamin with minerals iron-free (Centrum Silver) Take 1 tablet by mouth once daily.    Historical Provider, MD   olopatadine (Patanol) 0.1 % ophthalmic solution Administer into affected eye(s) twice a day. 3/9/16   Historical Provider, MD   omeprazole (PriLOSEC) 20 mg DR capsule TAKE 1 CAPSULE (20 MG) BY MOUTH ONCE DAILY IN THE MORNING. TAKE BEFORE MEALS. 5/21/25   Pedro Pablo Alejadnro MD   sod sulf-pot chloride-mag sulf (Sutab) 1.479-0.188- 0.225 gram tablet Starting at 6pm open one bottle of pills and fill glass provided with water and drink according to prep sheet. Start the 2nd bottle with directions on prep sheet 5 hours before procedure time 1/16/24   Colten Contreras MD   triamterene-hydrochlorothiazid " "(Maxzide-25) 37.5-25 mg tablet Take 1 tablet by mouth once daily. 8/15/24 8/15/25  Pedro Pablo Alejandro MD     No medication comments found.  Visit Vitals  BP (!) 153/92   Pulse 73   Temp 36.7 °C (98.1 °F) (Temporal)   Resp 17   Ht 1.803 m (5' 11\")   Wt 84.8 kg (186 lb 15.2 oz)   SpO2 97%   BMI 26.07 kg/m²   Smoking Status Never   BSA 2.06 m²                    Medical Gas Therapy: None (Room air)        7/22/2025     7:43 AM 6/11/2025     1:00 PM 5/20/2024     9:36 AM 5/2/2023    10:05 AM 5/19/2022     2:23 PM 4/1/2022     9:36 AM 12/10/2020    10:28 AM   BP REVIEW   BP (ultimate) 153/92 172/91 157/97 142/86 162/95 152/84 134/76     Recent Labs     07/17/25  1529 05/20/24  0958   WBC 9.5 7.5   HGB 13.2* 14.4   HCT 42.2 44.5    383   MCV 94 93     Recent Labs     07/17/25  1529 04/04/25  0731   EGFR 85 95   ANIONGAP 12 11   BUN 14 13   CREATININE 0.98 0.86    141   K 3.8 4.4    102   CO2 30 28   GLUCOSE 98 97   CALCIUM 9.4 9.5     Recent Labs     05/20/24  0958   TSH 1.14     Recent Labs     07/17/25  1529 05/20/24  0958   BILITOT 0.4 0.7   PROT 7.2 7.1   ALBUMIN 4.4 4.4   ALKPHOS 124 102   ALT 34 42   AST 25 26     Recent Labs     07/17/25  1529   PROTIME 9.9   INR 0.9         EKG No results found for this or any previous visit (from the past 4464 hours).  Ejection Fractions:No results found for: \"EF\"  Echocardiogram   No results found for this or any previous visit from the past 08959 days.     Stress TestingNo results found for this or any previous visit from the past 28847 days.    Cardiac CatheterizationNo results found for this or any previous visit from the past 29318 days.    Cardiac Scoring   CT HEART CALCIUM SCORING WO IV CONTRAST 06/11/2018      Impression  1. Coronary artery calcium score of 0*.  2. 5 mm anterior right lung nodular density for which follow-up  recommended. Mild patchy infiltrate or atelectasis in the middle lobe.  3. Prominence ascending thoracic aorta up to 3.9 cm.  4. Several " small indeterminate low-density foci throughout the  visualized liver for which correlation with dedicated CT of the  abdomen suggested for further evaluation.              Relevant Problems   Cardiac   (+) HTN (hypertension) (Resolved)   (+) Hypercholesterolemia (Resolved)      GI   (+) Acid reflux (Resolved)      Genitourinary   (+) Kidney mass (Resolved)       Clinical information reviewed:   Tobacco  Allergies  Meds   Med Hx  Surg Hx   Fam Hx  Soc Hx        NPO Detail:  NPO/Void Status  Date of Last Liquid: 07/21/25  Time of Last Liquid: 2000  Date of Last Solid: 07/21/25  Time of Last Solid: 1900  Last Intake Type: Clear fluids         Physical Exam    Airway  Mallampati: II  TM distance: >3 FB  Neck ROM: full  Mouth opening: 3 or more finger widths     Cardiovascular   Rhythm: regular  Rate: normal     Dental        Pulmonary Comments: Normal RR  Non-labored respiration    Abdominal            Anesthesia Plan    History of general anesthesia?: no  History of complications of general anesthesia?: unknown/emergency    ASA 3     general   (GETA with standard ASA monitoring)  intravenous induction   Postoperative pain plan includes opioids.  Anesthetic plan and risks discussed with patient.    Plan discussed with CAA and CRNA.             [1]   Past Medical History:  Diagnosis Date    Accelerated hypertension 01/01/1997    Acid reflux 05/02/2023    Allergic rhinitis 05/02/2023    Chest wall pain 05/02/2023    Closed bimalleolar fracture of left ankle 05/02/2023    Edema of right lower extremity 05/02/2023    HTN (hypertension) 05/02/2023    Hypercholesterolemia 01/01/1997    Kidney mass 05/02/2023    Left ankle injury, initial encounter 05/02/2023    Liver spots 05/02/2023    Lung nodules 05/02/2023    Personal history of other diseases of the circulatory system     History of hypertension    Personal history of other endocrine, nutritional and metabolic disease     History of hypercholesterolemia     Personal history of peptic ulcer disease     History of peptic ulcer   [2]   Past Surgical History:  Procedure Laterality Date    COLONOSCOPY  02/05/2014    Complete Colonoscopy   [3]   Social History  Tobacco Use    Smoking status: Never    Smokeless tobacco: Never   Substance Use Topics    Alcohol use: Yes     Alcohol/week: 7.0 standard drinks of alcohol     Types: 6 Cans of beer, 1 Shots of liquor per week    Drug use: Never   [4]   Allergies  Allergen Reactions    Animal Dander Other and Runny nose    Bee Pollen Unknown    Dog Dander Unknown    Seasonale (91) [Levonorgestrel-Ethinyl Estrad] Runny nose   [5]   Current Outpatient Medications:     atorvastatin (Lipitor) 10 mg tablet, TAKE 1 TABLET BY MOUTH EVERY DAY, Disp: 90 tablet, Rfl: 3    b complex vitamins capsule, Take 1 capsule by mouth once daily., Disp: 90 capsule, Rfl: 3    cholecalciferol (Vitamin D-3) 25 MCG (1000 UT) tablet, Take 1 tablet (25 mcg) by mouth once daily., Disp: 90 tablet, Rfl: 3    glucosamine-chondroitin 500-400 mg tablet, Take 1 tablet by mouth 4 times a day., Disp: , Rfl:     montelukast (Singulair) 10 mg tablet, Take 1 tablet (10 mg) by mouth once daily in the evening., Disp: , Rfl:     multivitamin with minerals iron-free (Centrum Silver), Take 1 tablet by mouth once daily., Disp: , Rfl:     olopatadine (Patanol) 0.1 % ophthalmic solution, Administer into affected eye(s) twice a day., Disp: , Rfl:     omeprazole (PriLOSEC) 20 mg DR capsule, TAKE 1 CAPSULE (20 MG) BY MOUTH ONCE DAILY IN THE MORNING. TAKE BEFORE MEALS., Disp: 90 capsule, Rfl: 3    triamterene-hydrochlorothiazid (Maxzide-25) 37.5-25 mg tablet, Take 1 tablet by mouth once daily., Disp: 90 tablet, Rfl: 3    sod sulf-pot chloride-mag sulf (Sutab) 1.479-0.188- 0.225 gram tablet, Starting at 6pm open one bottle of pills and fill glass provided with water and drink according to prep sheet. Start the 2nd bottle with directions on prep sheet 5 hours before procedure time  (Patient not taking: Reported on 7/22/2025), Disp: 24 tablet, Rfl: 0

## 2025-07-22 ENCOUNTER — HOSPITAL ENCOUNTER (OUTPATIENT)
Dept: RADIOLOGY | Facility: HOSPITAL | Age: 65
Discharge: HOME | End: 2025-07-22
Payer: COMMERCIAL

## 2025-07-22 ENCOUNTER — ANESTHESIA (OUTPATIENT)
Dept: RADIOLOGY | Facility: HOSPITAL | Age: 65
End: 2025-07-22
Payer: COMMERCIAL

## 2025-07-22 ENCOUNTER — APPOINTMENT (OUTPATIENT)
Dept: RADIOLOGY | Facility: HOSPITAL | Age: 65
End: 2025-07-22
Payer: COMMERCIAL

## 2025-07-22 VITALS
RESPIRATION RATE: 16 BRPM | WEIGHT: 186.95 LBS | TEMPERATURE: 97.3 F | SYSTOLIC BLOOD PRESSURE: 144 MMHG | HEART RATE: 75 BPM | HEIGHT: 71 IN | OXYGEN SATURATION: 98 % | DIASTOLIC BLOOD PRESSURE: 82 MMHG | BODY MASS INDEX: 26.17 KG/M2

## 2025-07-22 DIAGNOSIS — N28.89 RIGHT RENAL MASS: ICD-10-CM

## 2025-07-22 PROCEDURE — A50593 PR ABLATION RENAL TUMOR UNILATERAL PERQ CRYOTHERAPY: Performed by: ANESTHESIOLOGY

## 2025-07-22 PROCEDURE — 2550000001 HC RX 255 CONTRASTS: Performed by: STUDENT IN AN ORGANIZED HEALTH CARE EDUCATION/TRAINING PROGRAM

## 2025-07-22 PROCEDURE — 3700000002 HC GENERAL ANESTHESIA TIME - EACH INCREMENTAL 1 MINUTE

## 2025-07-22 PROCEDURE — 2500000004 HC RX 250 GENERAL PHARMACY W/ HCPCS (ALT 636 FOR OP/ED): Performed by: ANESTHESIOLOGIST ASSISTANT

## 2025-07-22 PROCEDURE — 77013 CT GUIDE FOR TISSUE ABLATION: CPT | Mod: RIGHT SIDE | Performed by: STUDENT IN AN ORGANIZED HEALTH CARE EDUCATION/TRAINING PROGRAM

## 2025-07-22 PROCEDURE — 50593 PERC CRYO ABLATE RENAL TUM: CPT | Mod: RIGHT SIDE | Performed by: STUDENT IN AN ORGANIZED HEALTH CARE EDUCATION/TRAINING PROGRAM

## 2025-07-22 PROCEDURE — 3700000001 HC GENERAL ANESTHESIA TIME - INITIAL BASE CHARGE

## 2025-07-22 PROCEDURE — 7100000001 HC RECOVERY ROOM TIME - INITIAL BASE CHARGE

## 2025-07-22 PROCEDURE — 7100000002 HC RECOVERY ROOM TIME - EACH INCREMENTAL 1 MINUTE

## 2025-07-22 PROCEDURE — 2500000004 HC RX 250 GENERAL PHARMACY W/ HCPCS (ALT 636 FOR OP/ED): Performed by: STUDENT IN AN ORGANIZED HEALTH CARE EDUCATION/TRAINING PROGRAM

## 2025-07-22 PROCEDURE — 50593 PERC CRYO ABLATE RENAL TUM: CPT | Mod: RT

## 2025-07-22 PROCEDURE — A50593 PR ABLATION RENAL TUMOR UNILATERAL PERQ CRYOTHERAPY: Performed by: ANESTHESIOLOGIST ASSISTANT

## 2025-07-22 PROCEDURE — 7100000010 HC PHASE TWO TIME - EACH INCREMENTAL 1 MINUTE

## 2025-07-22 PROCEDURE — 7100000009 HC PHASE TWO TIME - INITIAL BASE CHARGE

## 2025-07-22 PROCEDURE — 2500000004 HC RX 250 GENERAL PHARMACY W/ HCPCS (ALT 636 FOR OP/ED)

## 2025-07-22 PROCEDURE — 2720000007 HC OR 272 NO HCPCS

## 2025-07-22 RX ORDER — DROPERIDOL 2.5 MG/ML
0.62 INJECTION, SOLUTION INTRAMUSCULAR; INTRAVENOUS ONCE AS NEEDED
Status: DISCONTINUED | OUTPATIENT
Start: 2025-07-22 | End: 2025-07-23 | Stop reason: HOSPADM

## 2025-07-22 RX ORDER — FENTANYL CITRATE 50 UG/ML
INJECTION, SOLUTION INTRAMUSCULAR; INTRAVENOUS AS NEEDED
Status: DISCONTINUED | OUTPATIENT
Start: 2025-07-22 | End: 2025-07-22

## 2025-07-22 RX ORDER — ONDANSETRON HYDROCHLORIDE 2 MG/ML
INJECTION, SOLUTION INTRAVENOUS AS NEEDED
Status: DISCONTINUED | OUTPATIENT
Start: 2025-07-22 | End: 2025-07-22

## 2025-07-22 RX ORDER — CEFTRIAXONE 1 G/50ML
1 INJECTION, SOLUTION INTRAVENOUS ONCE
Status: COMPLETED | OUTPATIENT
Start: 2025-07-22 | End: 2025-07-22

## 2025-07-22 RX ORDER — CEFTRIAXONE 1 G/1
1 INJECTION, POWDER, FOR SOLUTION INTRAMUSCULAR; INTRAVENOUS ONCE
Status: DISCONTINUED | OUTPATIENT
Start: 2025-07-22 | End: 2025-07-22

## 2025-07-22 RX ORDER — DEXAMETHASONE SODIUM PHOSPHATE 10 MG/ML
4 INJECTION INTRAMUSCULAR; INTRAVENOUS AS NEEDED
Status: DISCONTINUED | OUTPATIENT
Start: 2025-07-22 | End: 2025-07-23 | Stop reason: HOSPADM

## 2025-07-22 RX ORDER — SODIUM CHLORIDE, SODIUM LACTATE, POTASSIUM CHLORIDE, CALCIUM CHLORIDE 600; 310; 30; 20 MG/100ML; MG/100ML; MG/100ML; MG/100ML
100 INJECTION, SOLUTION INTRAVENOUS CONTINUOUS
Status: DISCONTINUED | OUTPATIENT
Start: 2025-07-22 | End: 2025-07-23 | Stop reason: HOSPADM

## 2025-07-22 RX ORDER — OXYCODONE HYDROCHLORIDE 5 MG/1
5 TABLET ORAL
Status: DISCONTINUED | OUTPATIENT
Start: 2025-07-22 | End: 2025-07-23 | Stop reason: HOSPADM

## 2025-07-22 RX ORDER — LIDOCAINE HYDROCHLORIDE 20 MG/ML
INJECTION, SOLUTION EPIDURAL; INFILTRATION; INTRACAUDAL; PERINEURAL AS NEEDED
Status: DISCONTINUED | OUTPATIENT
Start: 2025-07-22 | End: 2025-07-22

## 2025-07-22 RX ORDER — PROPOFOL 10 MG/ML
INJECTION, EMULSION INTRAVENOUS AS NEEDED
Status: DISCONTINUED | OUTPATIENT
Start: 2025-07-22 | End: 2025-07-22

## 2025-07-22 RX ORDER — ONDANSETRON HYDROCHLORIDE 2 MG/ML
4 INJECTION, SOLUTION INTRAVENOUS ONCE AS NEEDED
Status: DISCONTINUED | OUTPATIENT
Start: 2025-07-22 | End: 2025-07-23 | Stop reason: HOSPADM

## 2025-07-22 RX ORDER — HYDRALAZINE HYDROCHLORIDE 20 MG/ML
5 INJECTION INTRAMUSCULAR; INTRAVENOUS EVERY 30 MIN PRN
Status: DISCONTINUED | OUTPATIENT
Start: 2025-07-22 | End: 2025-07-23 | Stop reason: HOSPADM

## 2025-07-22 RX ORDER — ROCURONIUM BROMIDE 10 MG/ML
INJECTION, SOLUTION INTRAVENOUS AS NEEDED
Status: DISCONTINUED | OUTPATIENT
Start: 2025-07-22 | End: 2025-07-22

## 2025-07-22 RX ORDER — MIDAZOLAM HYDROCHLORIDE 2 MG/2ML
INJECTION, SOLUTION INTRAMUSCULAR; INTRAVENOUS AS NEEDED
Status: DISCONTINUED | OUTPATIENT
Start: 2025-07-22 | End: 2025-07-22

## 2025-07-22 RX ADMIN — ROCURONIUM BROMIDE 20 MG: 10 INJECTION, SOLUTION INTRAVENOUS at 10:19

## 2025-07-22 RX ADMIN — MIDAZOLAM HYDROCHLORIDE 2 MG: 1 INJECTION, SOLUTION INTRAMUSCULAR; INTRAVENOUS at 08:28

## 2025-07-22 RX ADMIN — ROCURONIUM BROMIDE 50 MG: 10 INJECTION, SOLUTION INTRAVENOUS at 08:59

## 2025-07-22 RX ADMIN — LIDOCAINE HYDROCHLORIDE 60 MG: 20 INJECTION, SOLUTION EPIDURAL; INFILTRATION; INTRACAUDAL; PERINEURAL at 08:59

## 2025-07-22 RX ADMIN — ROCURONIUM BROMIDE 10 MG: 10 INJECTION, SOLUTION INTRAVENOUS at 11:36

## 2025-07-22 RX ADMIN — ONDANSETRON 4 MG: 2 INJECTION, SOLUTION INTRAMUSCULAR; INTRAVENOUS at 12:11

## 2025-07-22 RX ADMIN — DEXAMETHASONE SODIUM PHOSPHATE 4 MG: 4 INJECTION, SOLUTION INTRA-ARTICULAR; INTRALESIONAL; INTRAMUSCULAR; INTRAVENOUS; SOFT TISSUE at 09:02

## 2025-07-22 RX ADMIN — FENTANYL CITRATE 50 MCG: 50 INJECTION, SOLUTION INTRAMUSCULAR; INTRAVENOUS at 10:19

## 2025-07-22 RX ADMIN — ROCURONIUM BROMIDE 30 MG: 10 INJECTION, SOLUTION INTRAVENOUS at 09:38

## 2025-07-22 RX ADMIN — FENTANYL CITRATE 50 MCG: 50 INJECTION, SOLUTION INTRAMUSCULAR; INTRAVENOUS at 11:37

## 2025-07-22 RX ADMIN — CEFTRIAXONE 1 G: 1 INJECTION, SOLUTION INTRAVENOUS at 09:30

## 2025-07-22 RX ADMIN — SODIUM CHLORIDE, POTASSIUM CHLORIDE, SODIUM LACTATE AND CALCIUM CHLORIDE: 600; 310; 30; 20 INJECTION, SOLUTION INTRAVENOUS at 08:28

## 2025-07-22 RX ADMIN — ROCURONIUM BROMIDE 20 MG: 10 INJECTION, SOLUTION INTRAVENOUS at 10:58

## 2025-07-22 RX ADMIN — SUGAMMADEX 200 MG: 100 INJECTION, SOLUTION INTRAVENOUS at 12:27

## 2025-07-22 RX ADMIN — PROPOFOL 200 MG: 10 INJECTION, EMULSION INTRAVENOUS at 08:59

## 2025-07-22 RX ADMIN — ROCURONIUM BROMIDE 20 MG: 10 INJECTION, SOLUTION INTRAVENOUS at 12:00

## 2025-07-22 RX ADMIN — IOHEXOL 75 ML: 350 INJECTION, SOLUTION INTRAVENOUS at 12:52

## 2025-07-22 ASSESSMENT — PAIN - FUNCTIONAL ASSESSMENT
PAIN_FUNCTIONAL_ASSESSMENT: 0-10
PAIN_FUNCTIONAL_ASSESSMENT: UNABLE TO SELF-REPORT
PAIN_FUNCTIONAL_ASSESSMENT: 0-10
PAIN_FUNCTIONAL_ASSESSMENT: 0-10
PAIN_FUNCTIONAL_ASSESSMENT: UNABLE TO SELF-REPORT
PAIN_FUNCTIONAL_ASSESSMENT: 0-10

## 2025-07-22 ASSESSMENT — COLUMBIA-SUICIDE SEVERITY RATING SCALE - C-SSRS
6. HAVE YOU EVER DONE ANYTHING, STARTED TO DO ANYTHING, OR PREPARED TO DO ANYTHING TO END YOUR LIFE?: NO
2. HAVE YOU ACTUALLY HAD ANY THOUGHTS OF KILLING YOURSELF?: NO
1. IN THE PAST MONTH, HAVE YOU WISHED YOU WERE DEAD OR WISHED YOU COULD GO TO SLEEP AND NOT WAKE UP?: NO

## 2025-07-22 ASSESSMENT — PAIN SCALES - GENERAL
PAINLEVEL_OUTOF10: 0 - NO PAIN

## 2025-07-22 NOTE — PERIOPERATIVE NURSING NOTE
1430 Assuming care of pt at this time. Bedside report received from outgoing RN.   1530 pt off of bedrest,   Discharge instructions provided to pt and family. Educated on medications, effects of anesthesia, and homecoming care. Pt and family verbalizing understanding of all instructions provided at this time. All questions and concerns answered. Pt given contact information for provider.   1535 Pt assisted with dressing with help of family. IV removed dressing applied. Dr at bedside.  Pt meets criteria to discharge from phase 2   154 Pt assisted to main lobby via  by transport. Dc in stable condition to home. All belongings taken with pt.

## 2025-07-22 NOTE — PRE-PROCEDURE NOTE
Interventional Radiology Preprocedure Note    Indication for procedure: The encounter diagnosis was Right renal mass.    Relevant review of systems: NA    Relevant Labs:   Lab Results   Component Value Date    CREATININE 0.98 07/17/2025    EGFR 85 07/17/2025    INR 0.9 07/17/2025    PROTIME 9.9 07/17/2025       Planned Sedation/Anesthesia: Deep    Airway assessment: normal    Directed physical examination:    GENERAL: awake, no acute distress  HEENT: AT/NC  NECK: supple  CV: RRR  PULM: non-labored breathing  ABDOMEN: soft, ND  NEURO: AAOx3  MSK: full ROM  SKIN: no rash    Mallampati: II (hard and soft palate, upper portion of tonsils and uvula visible)    ASA Score: ASA 2 - Patient with mild systemic disease with no functional limitations    Benefits, risks and alternatives of procedure and planned sedation have been discussed with the patient and/or their representative. All questions answered and they agree to proceed.

## 2025-07-22 NOTE — POST-PROCEDURE NOTE
Interventional Radiology Brief Postprocedure Note    Attending: Stephen Chiu    Diagnosis: Right renal mass    Description of procedure: CT guided core needle biopsy x2 and cyroablation of the right superior pole renal mass     Anesthesia:  General    Complications: None    Estimated Blood Loss: minimal    Medications (Filter: Administrations occurring from 1235 to 1235 on 07/22/25)      None          ID Type Source Tests Collected by Time   1 : Rt Renal mass bx during cryo ablation Tissue KIDNEY BIOPSY NATIVE RIGHT SURGICAL PATHOLOGY EXAM Samuel Chiu MD 7/22/2025 1120         See detailed result report with images in PACS.    The patient tolerated the procedure well without incident or complication and is in stable condition.

## 2025-07-22 NOTE — ANESTHESIA PROCEDURE NOTES
Airway  Date/Time: 7/22/2025 9:01 AM  Reason: elective    Airway not difficult    Staffing  Performed: BRIAN   Authorized by: Keily Buck MD    Performed by: BRIAN Hinds  Patient location during procedure: OR    Patient Condition  Indications for airway management: anesthesia and airway protection  Patient position: sniffing  MILS not maintained throughout  Planned trial extubation  Sedation level: deep     Final Airway Details   Preoxygenated: yes  Final airway type: endotracheal airway  Successful airway: ETT  Cuffed: yes   Successful intubation technique: direct laryngoscopy  Endotracheal tube insertion site: oral  Blade: Leticia  Blade size: #4  ETT size (mm): 7.5  Cormack-Lehane Classification: grade IIa - partial view of glottis  Placement verified by: chest auscultation, capnometry and palpation of cuff   Measured from: teeth  ETT to teeth (cm): 22  Number of attempts at approach: 1

## 2025-07-23 DIAGNOSIS — N28.89 RIGHT RENAL MASS: ICD-10-CM

## 2025-07-23 NOTE — ANESTHESIA POSTPROCEDURE EVALUATION
"Patient: Basilio Salcedo \"Get\"    Procedure Summary       Date: 07/22/25 Room / Location: [facility]    Anesthesia Start: 0828 Anesthesia Stop: 1233    Procedure: CT GUIDED CRYOABLATION RENAL RIGHT Diagnosis:       Right renal mass      (Right renal mass)    Scheduled Providers: Keily Buck MD; BRIAN Hinds Responsible Provider: Keily Buck MD    Anesthesia Type: general ASA Status: 3            Anesthesia Type: general    Vitals Value Taken Time   /71 07/22/25 14:29   Temp 36.3 °C (97.3 °F) 07/22/25 14:29   Pulse 72 07/22/25 14:29   Resp 16 07/22/25 14:29   SpO2 98 % 07/22/25 14:29       Anesthesia Post Evaluation    Patient location during evaluation: PACU  Patient participation: complete - patient participated  Level of consciousness: awake  Pain management: adequate  Multimodal analgesia pain management approach  Airway patency: patent  Cardiovascular status: hemodynamically stable  Respiratory status: spontaneous ventilation  Hydration status: euvolemic  Postoperative Nausea and Vomiting: none        No notable events documented.    "

## 2025-07-24 ENCOUNTER — APPOINTMENT (OUTPATIENT)
Dept: PRIMARY CARE | Facility: CLINIC | Age: 65
End: 2025-07-24
Payer: COMMERCIAL

## 2025-07-24 VITALS
BODY MASS INDEX: 26.29 KG/M2 | OXYGEN SATURATION: 94 % | DIASTOLIC BLOOD PRESSURE: 88 MMHG | SYSTOLIC BLOOD PRESSURE: 144 MMHG | HEART RATE: 73 BPM | WEIGHT: 188.5 LBS | TEMPERATURE: 97.3 F

## 2025-07-24 DIAGNOSIS — C64.9 MALIGNANT NEOPLASM OF KIDNEY, UNSPECIFIED LATERALITY (MULTI): ICD-10-CM

## 2025-07-24 DIAGNOSIS — I10 HYPERTENSION, UNSPECIFIED TYPE: ICD-10-CM

## 2025-07-24 DIAGNOSIS — Z00.00 HEALTH MAINTENANCE EXAMINATION: Primary | ICD-10-CM

## 2025-07-24 LAB
APPEARANCE UR: CLEAR
BILIRUB UR QL STRIP: NEGATIVE
CHOLEST SERPL-MCNC: 149 MG/DL (ref 0–199)
CHOLESTEROL/HDL RATIO: 3.5 (ref 4.2–7)
COLOR UR: YELLOW
GLUCOSE UR STRIP-MCNC: NEGATIVE MG/DL
HDLC SERPL-MCNC: 43 MG/DL (ref 40–59)
HGB UR QL STRIP: ABNORMAL
IS PATIENT FASTING: ABNORMAL
KETONES UR STRIP-MCNC: NEGATIVE MG/DL
LDLC SERPL DIRECT ASSAY-MCNC: 80 MG/DL (ref 0–100)
LEUKOCYTE ESTERASE UR QL STRIP: NEGATIVE
NITRITE UR QL STRIP: NEGATIVE
PH UR STRIP: 7 [PH]
PROT UR STRIP-MCNC: ABNORMAL MG/DL
PSA SERPL-MCNC: 0.87 NG/ML
SP GR UR STRIP.AUTO: 1.02
TRIGL SERPL-MCNC: 111 MG/DL
TSH SERPL-ACNC: 0.85 MIU/L (ref 0.44–3.98)
UROBILINOGEN UR STRIP-ACNC: 0.2 E.U./DL

## 2025-07-24 PROCEDURE — 84443 ASSAY THYROID STIM HORMONE: CPT | Performed by: INTERNAL MEDICINE

## 2025-07-24 PROCEDURE — 99397 PER PM REEVAL EST PAT 65+ YR: CPT | Performed by: INTERNAL MEDICINE

## 2025-07-24 PROCEDURE — 1160F RVW MEDS BY RX/DR IN RCRD: CPT | Performed by: INTERNAL MEDICINE

## 2025-07-24 PROCEDURE — 3079F DIAST BP 80-89 MM HG: CPT | Performed by: INTERNAL MEDICINE

## 2025-07-24 PROCEDURE — 1036F TOBACCO NON-USER: CPT | Performed by: INTERNAL MEDICINE

## 2025-07-24 PROCEDURE — 3077F SYST BP >= 140 MM HG: CPT | Performed by: INTERNAL MEDICINE

## 2025-07-24 PROCEDURE — 84153 ASSAY OF PSA TOTAL: CPT | Performed by: INTERNAL MEDICINE

## 2025-07-24 PROCEDURE — 1159F MED LIST DOCD IN RCRD: CPT | Performed by: INTERNAL MEDICINE

## 2025-07-24 RX ORDER — TRIAMTERENE AND HYDROCHLOROTHIAZIDE 75; 50 MG/1; MG/1
1 TABLET ORAL DAILY
Qty: 90 TABLET | Refills: 3 | Status: SHIPPED | OUTPATIENT
Start: 2025-07-24 | End: 2026-07-24

## 2025-07-24 ASSESSMENT — ENCOUNTER SYMPTOMS
OCCASIONAL FEELINGS OF UNSTEADINESS: 0
DEPRESSION: 0
LOSS OF SENSATION IN FEET: 0

## 2025-07-24 ASSESSMENT — PATIENT HEALTH QUESTIONNAIRE - PHQ9
2. FEELING DOWN, DEPRESSED OR HOPELESS: NOT AT ALL
1. LITTLE INTEREST OR PLEASURE IN DOING THINGS: NOT AT ALL
SUM OF ALL RESPONSES TO PHQ9 QUESTIONS 1 AND 2: 0

## 2025-07-24 NOTE — PROGRESS NOTES
Subjective   Patient ID: Get Salcedo is a 65 y.o. male who presents for Annual Exam.    HPI   65 years old male comes in to see me today for an age related wellness exam.  Had a biopsy done on right kidney mass, no results available, according to him is a cyst on the right kidney resolved.  He believes it is a good prognosis and he will have a CAT scan repeated in January next year on his right kidney.  We treated him for hyperlipidemia, low vitamin D, difficulty breathing at times, GERD, and hypertension.  He feels that his blood pressure is not very well-controlled.  At the biopsy and before the biopsies pressure was 170/110.  Takes triamterene hydrochlorothiazide 37.5/25 mg a day.  Lives in [place] with his wife Phyllis.  No children.  Allergic to seasonal pollen and grass.  Non-smoker drinks beers 6-8 beers per week and sometimes every other Saturday scotch.  Works in the [employer] as a manager.  No surgical history.  Often he exercises.  His home weight is 185 pounds.  Father  from heart disease.  Mother is 96 years old.  Brother in good health.  2 sisters 1 with heart disease and valve replacement and 1 in good health when the    Review of Systems  12 system reviewed and 12 system are negative.  Hypertension tension not well-controlled.  Watch your weight, watch the salt intake lose weight.  Objective   /88 (BP Location: Left arm, Patient Position: Sitting, BP Cuff Size: Adult)   Pulse 73   Temp 36.3 °C (97.3 °F) (Temporal)   Wt 85.5 kg (188 lb 8 oz)   SpO2 94%   BMI 26.29 kg/m²   His colonoscopy due in 2029  Physical Exam  Alert oriented in no acute acute distress.  Nonicteric sclera or jaundice.  Face symmetrical cranial nerves intact.  Neck supple no masses lymph no thyromegaly or jugular venous distention.  No carotid bruits.  Lungs clear no rales or wheezing.  Heart normal S1 and S2 regular rhythm.  Abdomen benign nontender no masses no organomegaly no pain on palpations.  No  epigastric bruit or murmur.  Neurologically intact equal strength in the upper and in the lower extremities.  No sensory deficit.  Skin intact.  Increased Maxide or triamterene HCTZ to double the dose 75/50 mg a day.  Will refill at and will ask him to check his blood pressure and to let me know will come back in a month to check it out.  Assessment/Plan   Diagnoses and all orders for this visit:  Health maintenance examination  -     Lipid Panel  -     Thyroid Stimulating Hormone  -     Prostate Specific Antigen  -     POCT UA (Automated) docked device  Malignant neoplasm of kidney, unspecified laterality (Multi)  Other orders  -     POCT URINALYSIS AUTOMATED

## 2025-07-26 ENCOUNTER — RESULTS FOLLOW-UP (OUTPATIENT)
Dept: PRIMARY CARE | Facility: CLINIC | Age: 65
End: 2025-07-26
Payer: COMMERCIAL

## 2025-07-26 NOTE — TELEPHONE ENCOUNTER
Called the patient today and talked him about his lab results and his wife's.  Increased her vitamin D to 3000 mg a day since she was taking already 2000.  Will repeat her vitamin D level in the future.  For himself he has normal lipid and normal thyroid and PSA.  He has large amount of blood in the urine and some 3+ protein.  He needs to have a urinanalysis rechecked also in the near future.  He just purchased a blood pressure machine and he is about to start watching it and checking on it he will let me know.

## 2025-07-28 DIAGNOSIS — I10 HYPERTENSION, UNSPECIFIED TYPE: ICD-10-CM

## 2025-07-28 RX ORDER — TRIAMTERENE AND HYDROCHLOROTHIAZIDE 37.5; 25 MG/1; MG/1
1 TABLET ORAL DAILY
Qty: 90 TABLET | Refills: 3 | Status: SHIPPED | OUTPATIENT
Start: 2025-07-28

## 2025-07-31 LAB
LAB AP ASR DISCLAIMER: NORMAL
LAB AP BLOCK FOR ADDITIONAL STUDIES: NORMAL
LABORATORY COMMENT REPORT: NORMAL
PATH REPORT.FINAL DX SPEC: NORMAL
PATH REPORT.GROSS SPEC: NORMAL
PATH REPORT.RELEVANT HX SPEC: NORMAL
PATH REPORT.TOTAL CANCER: NORMAL

## 2025-08-07 LAB
AP SUMMARY REPORT: NORMAL
SCAN RESULT: NORMAL

## 2026-01-02 ENCOUNTER — APPOINTMENT (OUTPATIENT)
Dept: RADIOLOGY | Facility: CLINIC | Age: 66
End: 2026-01-02
Payer: COMMERCIAL